# Patient Record
Sex: FEMALE | Race: WHITE | NOT HISPANIC OR LATINO
[De-identification: names, ages, dates, MRNs, and addresses within clinical notes are randomized per-mention and may not be internally consistent; named-entity substitution may affect disease eponyms.]

---

## 2017-08-21 ENCOUNTER — APPOINTMENT (OUTPATIENT)
Dept: OTOLARYNGOLOGY | Facility: CLINIC | Age: 37
End: 2017-08-21
Payer: MEDICAID

## 2017-08-21 ENCOUNTER — LABORATORY RESULT (OUTPATIENT)
Age: 37
End: 2017-08-21

## 2017-08-21 VITALS
HEART RATE: 67 BPM | DIASTOLIC BLOOD PRESSURE: 77 MMHG | TEMPERATURE: 98.4 F | OXYGEN SATURATION: 97 % | SYSTOLIC BLOOD PRESSURE: 112 MMHG

## 2017-08-21 VITALS — BODY MASS INDEX: 27.47 KG/M2 | HEIGHT: 67 IN | WEIGHT: 175 LBS

## 2017-08-21 DIAGNOSIS — Z83.3 FAMILY HISTORY OF DIABETES MELLITUS: ICD-10-CM

## 2017-08-21 DIAGNOSIS — R09.81 NASAL CONGESTION: ICD-10-CM

## 2017-08-21 DIAGNOSIS — Z84.89 FAMILY HISTORY OF OTHER SPECIFIED CONDITIONS: ICD-10-CM

## 2017-08-21 DIAGNOSIS — F17.200 NICOTINE DEPENDENCE, UNSPECIFIED, UNCOMPLICATED: ICD-10-CM

## 2017-08-21 DIAGNOSIS — Z82.2 FAMILY HISTORY OF DEAFNESS AND HEARING LOSS: ICD-10-CM

## 2017-08-21 DIAGNOSIS — Z82.49 FAMILY HISTORY OF ISCHEMIC HEART DISEASE AND OTHER DISEASES OF THE CIRCULATORY SYSTEM: ICD-10-CM

## 2017-08-21 DIAGNOSIS — J34.89 NASAL CONGESTION: ICD-10-CM

## 2017-08-21 DIAGNOSIS — R09.89 OTHER SPECIFIED SYMPTOMS AND SIGNS INVOLVING THE CIRCULATORY AND RESPIRATORY SYSTEMS: ICD-10-CM

## 2017-08-21 DIAGNOSIS — Z87.09 PERSONAL HISTORY OF OTHER DISEASES OF THE RESPIRATORY SYSTEM: ICD-10-CM

## 2017-08-21 DIAGNOSIS — R51 HEADACHE: ICD-10-CM

## 2017-08-21 DIAGNOSIS — F15.90 OTHER STIMULANT USE, UNSPECIFIED, UNCOMPLICATED: ICD-10-CM

## 2017-08-21 DIAGNOSIS — J30.0 VASOMOTOR RHINITIS: ICD-10-CM

## 2017-08-21 DIAGNOSIS — Z81.8 FAMILY HISTORY OF OTHER MENTAL AND BEHAVIORAL DISORDERS: ICD-10-CM

## 2017-08-21 DIAGNOSIS — J34.2 DEVIATED NASAL SEPTUM: ICD-10-CM

## 2017-08-21 DIAGNOSIS — Z80.9 FAMILY HISTORY OF MALIGNANT NEOPLASM, UNSPECIFIED: ICD-10-CM

## 2017-08-21 DIAGNOSIS — H93.A9 PULSATILE TINNITUS, UNSPECIFIED EAR: ICD-10-CM

## 2017-08-21 PROCEDURE — 92550 TYMPANOMETRY & REFLEX THRESH: CPT

## 2017-08-21 PROCEDURE — 99203 OFFICE O/P NEW LOW 30 MIN: CPT | Mod: 25

## 2017-08-21 PROCEDURE — 92557 COMPREHENSIVE HEARING TEST: CPT

## 2017-08-21 PROCEDURE — 31231 NASAL ENDOSCOPY DX: CPT

## 2017-08-21 RX ORDER — FLUTICASONE PROPIONATE 50 UG/1
50 SPRAY, METERED NASAL DAILY
Qty: 1 | Refills: 6 | Status: ACTIVE | COMMUNITY
Start: 2017-08-21 | End: 1900-01-01

## 2017-08-24 ENCOUNTER — APPOINTMENT (OUTPATIENT)
Dept: BARIATRICS | Facility: CLINIC | Age: 37
End: 2017-08-24
Payer: MEDICAID

## 2017-08-24 VITALS
WEIGHT: 175 LBS | TEMPERATURE: 98.1 F | SYSTOLIC BLOOD PRESSURE: 115 MMHG | OXYGEN SATURATION: 98 % | HEIGHT: 67 IN | HEART RATE: 80 BPM | DIASTOLIC BLOOD PRESSURE: 76 MMHG | BODY MASS INDEX: 27.47 KG/M2

## 2017-08-24 DIAGNOSIS — K91.2 POSTSURGICAL MALABSORPTION, NOT ELSEWHERE CLASSIFIED: ICD-10-CM

## 2017-08-24 LAB
A ALTERNATA IGE QN: 0.13 KUA/L
A FUMIGATUS IGE QN: 0.23 KUA/L
C ALBICANS IGE QN: 0.34 KUA/L
C HERBARUM IGE QN: 0.21 KUA/L
CAT DANDER IGE QN: <0.1 KUA/L
COMMON RAGWEED IGE QN: <0.1 KUA/L
D FARINAE IGE QN: 0.2 KUA/L
D PTERONYSS IGE QN: 0.16 KUA/L
DEPRECATED A ALTERNATA IGE RAST QL: NORMAL
DEPRECATED A FUMIGATUS IGE RAST QL: NORMAL
DEPRECATED C ALBICANS IGE RAST QL: NORMAL
DEPRECATED C HERBARUM IGE RAST QL: NORMAL
DEPRECATED CAT DANDER IGE RAST QL: 0
DEPRECATED COMMON RAGWEED IGE RAST QL: 0
DEPRECATED D FARINAE IGE RAST QL: NORMAL
DEPRECATED D PTERONYSS IGE RAST QL: NORMAL
DEPRECATED DOG DANDER IGE RAST QL: 0
DEPRECATED M RACEMOSUS IGE RAST QL: NORMAL
DEPRECATED ROACH IGE RAST QL: NORMAL
DEPRECATED TIMOTHY IGE RAST QL: NORMAL
DEPRECATED WHITE OAK IGE RAST QL: NORMAL
DOG DANDER IGE QN: <0.1 KUA/L
M RACEMOSUS IGE QN: 0.12 KUA/L
ROACH IGE QN: 0.18 KUA/L
TIMOTHY IGE QN: 0.21 KUA/L
WHITE OAK IGE QN: 0.19 KUA/L

## 2017-08-24 PROCEDURE — 99213 OFFICE O/P EST LOW 20 MIN: CPT

## 2017-08-24 RX ORDER — SUCRALFATE 1 G/10ML
1 SUSPENSION ORAL 3 TIMES DAILY
Qty: 850 | Refills: 1 | Status: ACTIVE | COMMUNITY
Start: 2017-08-24 | End: 1900-01-01

## 2017-09-01 PROBLEM — K91.2 POSTOPERATIVE MALABSORPTION: Status: ACTIVE | Noted: 2017-08-24

## 2017-09-15 ENCOUNTER — OTHER (OUTPATIENT)
Age: 37
End: 2017-09-15

## 2017-10-26 ENCOUNTER — APPOINTMENT (OUTPATIENT)
Dept: BARIATRICS | Facility: CLINIC | Age: 37
End: 2017-10-26

## 2017-11-16 ENCOUNTER — APPOINTMENT (OUTPATIENT)
Dept: BARIATRICS | Facility: CLINIC | Age: 37
End: 2017-11-16
Payer: MEDICAID

## 2017-11-16 VITALS
DIASTOLIC BLOOD PRESSURE: 81 MMHG | BODY MASS INDEX: 26.55 KG/M2 | TEMPERATURE: 98.1 F | SYSTOLIC BLOOD PRESSURE: 131 MMHG | HEIGHT: 67 IN | HEART RATE: 86 BPM | WEIGHT: 169.13 LBS | OXYGEN SATURATION: 99 %

## 2017-11-16 PROCEDURE — 99213 OFFICE O/P EST LOW 20 MIN: CPT

## 2018-01-16 RX ORDER — SCOPALAMINE 1 MG/3D
1.5 PATCH, EXTENDED RELEASE TRANSDERMAL ONCE
Qty: 0 | Refills: 0 | Status: COMPLETED | OUTPATIENT
Start: 2018-01-23 | End: 2018-01-23

## 2018-01-18 ENCOUNTER — APPOINTMENT (OUTPATIENT)
Dept: BARIATRICS | Facility: CLINIC | Age: 38
End: 2018-01-18
Payer: MEDICAID

## 2018-01-18 VITALS
HEART RATE: 85 BPM | OXYGEN SATURATION: 98 % | HEIGHT: 67 IN | DIASTOLIC BLOOD PRESSURE: 78 MMHG | WEIGHT: 177 LBS | TEMPERATURE: 97.6 F | SYSTOLIC BLOOD PRESSURE: 110 MMHG | BODY MASS INDEX: 27.78 KG/M2

## 2018-01-18 PROCEDURE — 99213 OFFICE O/P EST LOW 20 MIN: CPT

## 2018-01-22 VITALS
OXYGEN SATURATION: 100 % | DIASTOLIC BLOOD PRESSURE: 66 MMHG | HEART RATE: 72 BPM | HEIGHT: 66.5 IN | RESPIRATION RATE: 18 BRPM | SYSTOLIC BLOOD PRESSURE: 104 MMHG | TEMPERATURE: 98 F | WEIGHT: 175.05 LBS

## 2018-01-22 NOTE — PATIENT PROFILE ADULT. - PSH
Elective surgery  Total Body Lift  Elective surgery  Left foot surgery  H/O bariatric surgery    History of bunionectomy of both great toes    History of lithotripsy    S/P laparoscopic sleeve gastrectomy

## 2018-01-23 ENCOUNTER — APPOINTMENT (OUTPATIENT)
Dept: BARIATRICS | Facility: HOSPITAL | Age: 38
End: 2018-01-23

## 2018-01-23 ENCOUNTER — INPATIENT (INPATIENT)
Facility: HOSPITAL | Age: 38
LOS: 1 days | Discharge: HOME | DRG: 328 | End: 2018-01-25
Attending: SURGERY | Admitting: SURGERY
Payer: COMMERCIAL

## 2018-01-23 DIAGNOSIS — Z98.890 OTHER SPECIFIED POSTPROCEDURAL STATES: Chronic | ICD-10-CM

## 2018-01-23 DIAGNOSIS — K21.9 GASTRO-ESOPHAGEAL REFLUX DISEASE WITHOUT ESOPHAGITIS: ICD-10-CM

## 2018-01-23 DIAGNOSIS — Z98.84 BARIATRIC SURGERY STATUS: Chronic | ICD-10-CM

## 2018-01-23 DIAGNOSIS — Z41.9 ENCOUNTER FOR PROCEDURE FOR PURPOSES OTHER THAN REMEDYING HEALTH STATE, UNSPECIFIED: Chronic | ICD-10-CM

## 2018-01-23 LAB
GLUCOSE BLDC GLUCOMTR-MCNC: 111 MG/DL — HIGH (ref 70–99)
GLUCOSE BLDC GLUCOMTR-MCNC: 121 MG/DL — HIGH (ref 70–99)
HCT VFR BLD CALC: 32.1 % — LOW (ref 34.5–45)
HGB BLD-MCNC: 9.7 G/DL — LOW (ref 11.5–15.5)
MCHC RBC-ENTMCNC: 22.6 PG — LOW (ref 27–34)
MCHC RBC-ENTMCNC: 30.2 G/DL — LOW (ref 32–36)
MCV RBC AUTO: 74.7 FL — LOW (ref 80–100)
PLATELET # BLD AUTO: 235 K/UL — SIGNIFICANT CHANGE UP (ref 150–400)
RBC # BLD: 4.3 M/UL — SIGNIFICANT CHANGE UP (ref 3.8–5.2)
RBC # FLD: 17.9 % — HIGH (ref 10.3–16.9)
WBC # BLD: 12.2 K/UL — HIGH (ref 3.8–10.5)
WBC # FLD AUTO: 12.2 K/UL — HIGH (ref 3.8–10.5)

## 2018-01-23 PROCEDURE — 43644 LAP GASTRIC BYPASS/ROUX-EN-Y: CPT | Mod: GC

## 2018-01-23 RX ORDER — DEXTROSE 50 % IN WATER 50 %
12.5 SYRINGE (ML) INTRAVENOUS ONCE
Qty: 0 | Refills: 0 | Status: DISCONTINUED | OUTPATIENT
Start: 2018-01-23 | End: 2018-01-25

## 2018-01-23 RX ORDER — ENOXAPARIN SODIUM 100 MG/ML
40 INJECTION SUBCUTANEOUS ONCE
Qty: 0 | Refills: 0 | Status: COMPLETED | OUTPATIENT
Start: 2018-01-23 | End: 2018-01-23

## 2018-01-23 RX ORDER — ONDANSETRON 8 MG/1
4 TABLET, FILM COATED ORAL EVERY 6 HOURS
Qty: 0 | Refills: 0 | Status: DISCONTINUED | OUTPATIENT
Start: 2018-01-23 | End: 2018-01-25

## 2018-01-23 RX ORDER — HEPARIN SODIUM 5000 [USP'U]/ML
7500 INJECTION INTRAVENOUS; SUBCUTANEOUS EVERY 8 HOURS
Qty: 0 | Refills: 0 | Status: DISCONTINUED | OUTPATIENT
Start: 2018-01-23 | End: 2018-01-23

## 2018-01-23 RX ORDER — PANTOPRAZOLE SODIUM 20 MG/1
40 TABLET, DELAYED RELEASE ORAL DAILY
Qty: 0 | Refills: 0 | Status: DISCONTINUED | OUTPATIENT
Start: 2018-01-23 | End: 2018-01-25

## 2018-01-23 RX ORDER — HYDROMORPHONE HYDROCHLORIDE 2 MG/ML
1 INJECTION INTRAMUSCULAR; INTRAVENOUS; SUBCUTANEOUS EVERY 4 HOURS
Qty: 0 | Refills: 0 | Status: DISCONTINUED | OUTPATIENT
Start: 2018-01-23 | End: 2018-01-24

## 2018-01-23 RX ORDER — HYDROMORPHONE HYDROCHLORIDE 2 MG/ML
0.5 INJECTION INTRAMUSCULAR; INTRAVENOUS; SUBCUTANEOUS EVERY 4 HOURS
Qty: 0 | Refills: 0 | Status: DISCONTINUED | OUTPATIENT
Start: 2018-01-23 | End: 2018-01-24

## 2018-01-23 RX ORDER — SODIUM CHLORIDE 9 MG/ML
1000 INJECTION, SOLUTION INTRAVENOUS
Qty: 0 | Refills: 0 | Status: DISCONTINUED | OUTPATIENT
Start: 2018-01-23 | End: 2018-01-25

## 2018-01-23 RX ORDER — INSULIN LISPRO 100/ML
VIAL (ML) SUBCUTANEOUS
Qty: 0 | Refills: 0 | Status: DISCONTINUED | OUTPATIENT
Start: 2018-01-23 | End: 2018-01-25

## 2018-01-23 RX ORDER — DEXTROSE 50 % IN WATER 50 %
25 SYRINGE (ML) INTRAVENOUS ONCE
Qty: 0 | Refills: 0 | Status: DISCONTINUED | OUTPATIENT
Start: 2018-01-23 | End: 2018-01-25

## 2018-01-23 RX ORDER — HEPARIN SODIUM 5000 [USP'U]/ML
5000 INJECTION INTRAVENOUS; SUBCUTANEOUS EVERY 8 HOURS
Qty: 0 | Refills: 0 | Status: DISCONTINUED | OUTPATIENT
Start: 2018-01-23 | End: 2018-01-25

## 2018-01-23 RX ORDER — SODIUM CHLORIDE 9 MG/ML
1000 INJECTION, SOLUTION INTRAVENOUS
Qty: 0 | Refills: 0 | Status: DISCONTINUED | OUTPATIENT
Start: 2018-01-23 | End: 2018-01-24

## 2018-01-23 RX ORDER — DEXTROSE 50 % IN WATER 50 %
1 SYRINGE (ML) INTRAVENOUS ONCE
Qty: 0 | Refills: 0 | Status: DISCONTINUED | OUTPATIENT
Start: 2018-01-23 | End: 2018-01-25

## 2018-01-23 RX ORDER — GLUCAGON INJECTION, SOLUTION 0.5 MG/.1ML
1 INJECTION, SOLUTION SUBCUTANEOUS ONCE
Qty: 0 | Refills: 0 | Status: DISCONTINUED | OUTPATIENT
Start: 2018-01-23 | End: 2018-01-25

## 2018-01-23 RX ADMIN — PANTOPRAZOLE SODIUM 40 MILLIGRAM(S): 20 TABLET, DELAYED RELEASE ORAL at 17:37

## 2018-01-23 RX ADMIN — ONDANSETRON 4 MILLIGRAM(S): 8 TABLET, FILM COATED ORAL at 14:45

## 2018-01-23 RX ADMIN — HEPARIN SODIUM 5000 UNIT(S): 5000 INJECTION INTRAVENOUS; SUBCUTANEOUS at 22:00

## 2018-01-23 RX ADMIN — HYDROMORPHONE HYDROCHLORIDE 1 MILLIGRAM(S): 2 INJECTION INTRAMUSCULAR; INTRAVENOUS; SUBCUTANEOUS at 21:41

## 2018-01-23 RX ADMIN — SODIUM CHLORIDE 150 MILLILITER(S): 9 INJECTION, SOLUTION INTRAVENOUS at 16:18

## 2018-01-23 RX ADMIN — SCOPALAMINE 1.5 MILLIGRAM(S): 1 PATCH, EXTENDED RELEASE TRANSDERMAL at 08:58

## 2018-01-23 RX ADMIN — HYDROMORPHONE HYDROCHLORIDE 1 MILLIGRAM(S): 2 INJECTION INTRAMUSCULAR; INTRAVENOUS; SUBCUTANEOUS at 22:00

## 2018-01-23 RX ADMIN — ENOXAPARIN SODIUM 40 MILLIGRAM(S): 100 INJECTION SUBCUTANEOUS at 08:58

## 2018-01-23 NOTE — H&P PST ADULT - HISTORY OF PRESENT ILLNESS
36 yo female with severe GERD, prior sleeve gastrectomy (2012 Dr. Leggett), lap CCY, lithotripsy presents for e;ective laparoscopic gastric bypass procedure.     PMH: GERD  PSH: sleeve gastrectomy, lap CCY, lithotrypsy  NKDA

## 2018-01-23 NOTE — BRIEF OPERATIVE NOTE - PROCEDURE
<<-----Click on this checkbox to enter Procedure Laparoscopic revision of Quin-en-Y gastric bypass procedure  01/23/2018    Active  JUJU

## 2018-01-23 NOTE — PROGRESS NOTE ADULT - SUBJECTIVE AND OBJECTIVE BOX
POST-OPERATIVE NOTE    Procedure: Conversion of sleeve gastrectomy to gastric bypass     Diagnosis/Indication: GERD    Surgeon: Dr. Leggett    S: Pt has no complaints. Denies CP, SOB, BURT, calf tenderness. Pain controlled with medication. Denies nausea, vomiting.    O:  T(C): 36.6 (01-23-18 @ 15:30), Max: 36.6 (01-23-18 @ 15:30)  T(F): 97.9 (01-23-18 @ 15:30), Max: 97.9 (01-23-18 @ 15:30)  HR: 66 (01-23-18 @ 16:00) (66 - 86)  BP: 107/71 (01-23-18 @ 16:00) (105/68 - 115/71)  RR: 18 (01-23-18 @ 16:00) (16 - 22)  SpO2: 100% (01-23-18 @ 16:00) (95% - 100%)  Wt(kg): --      Gen: NAD, resting comfortably in bed  C/V: NSR  Pulm: Nonlabored breathing, no respiratory distress  Abd: soft, NT/ND. Incisional bandage is clean, dry and intact.  Extrem: WWP, no calf edema or tenderness, SCDs in place    A/P: 37y Female s/p above procedure  Diet: Bariatric Clears  IVF:   Pain/nausea control  SQH/SCDs/OOBA/IS  Dispo pending pain control, PO tolerance, clinical improvement

## 2018-01-23 NOTE — H&P PST ADULT - ASSESSMENT
36 yo female with prior sleeve gastrectomy, presents for elective laparoscopic gastric bypass surgery  - admit to regional   - bariatric clears  - LR@150  - pain meds  - antinausea meds  - IV Protonix  - post-op labs/ am labs  - home meds as appropriate

## 2018-01-23 NOTE — BRIEF OPERATIVE NOTE - OPERATION/FINDINGS
Patient underwent laparoscopic conversion of vertical sleeve gastrectomy to RNY gastric bypass without any complications. The biliopancreatic limb is approximately 40cm in length and a side-to-side functional end-to-end stapled (Blue 60mm) jejunal-jejunal anastomosis was created with closure of small bowel mesentery. The Quin limb is approximately 120cm in length and was retrocolic and anterior gastric hand-sewn gastro-jejunal anastomosis with no tension and excellent blood supply and closure of it's mesenteric defect. The methylene blue test performed and was negative.

## 2018-01-23 NOTE — H&P ADULT - PMH
Gastroesophageal reflux disease, esophagitis presence not specified    HTN (hypertension)    Obesity, unspecified classification, unspecified obesity type, unspecified whether serious comorbidity present    Prediabetes

## 2018-01-23 NOTE — H&P ADULT - HISTORY OF PRESENT ILLNESS
Patient is an 36 yo F with PMH significant for obesity s/p vertical sleeve gastrectomy (2012) who who is suffering from severe reflux despite anti-reflux medications. Patient is scheduled to undergo conversion of gastric sleeve to gastric bypass today.

## 2018-01-24 ENCOUNTER — TRANSCRIPTION ENCOUNTER (OUTPATIENT)
Age: 38
End: 2018-01-24

## 2018-01-24 ENCOUNTER — APPOINTMENT (OUTPATIENT)
Dept: RADIOLOGY | Facility: HOSPITAL | Age: 38
End: 2018-01-24

## 2018-01-24 LAB
ANION GAP SERPL CALC-SCNC: 10 MMOL/L — SIGNIFICANT CHANGE UP (ref 5–17)
BUN SERPL-MCNC: 6 MG/DL — LOW (ref 7–23)
CALCIUM SERPL-MCNC: 8.2 MG/DL — LOW (ref 8.4–10.5)
CHLORIDE SERPL-SCNC: 102 MMOL/L — SIGNIFICANT CHANGE UP (ref 96–108)
CO2 SERPL-SCNC: 27 MMOL/L — SIGNIFICANT CHANGE UP (ref 22–31)
CREAT SERPL-MCNC: 0.75 MG/DL — SIGNIFICANT CHANGE UP (ref 0.5–1.3)
GLUCOSE BLDC GLUCOMTR-MCNC: 78 MG/DL — SIGNIFICANT CHANGE UP (ref 70–99)
GLUCOSE BLDC GLUCOMTR-MCNC: 90 MG/DL — SIGNIFICANT CHANGE UP (ref 70–99)
GLUCOSE BLDC GLUCOMTR-MCNC: 91 MG/DL — SIGNIFICANT CHANGE UP (ref 70–99)
GLUCOSE BLDC GLUCOMTR-MCNC: 97 MG/DL — SIGNIFICANT CHANGE UP (ref 70–99)
GLUCOSE SERPL-MCNC: 83 MG/DL — SIGNIFICANT CHANGE UP (ref 70–99)
HBA1C BLD-MCNC: 4.8 % — SIGNIFICANT CHANGE UP (ref 4–5.6)
HCT VFR BLD CALC: 27.5 % — LOW (ref 34.5–45)
HGB BLD-MCNC: 8.5 G/DL — LOW (ref 11.5–15.5)
MAGNESIUM SERPL-MCNC: 1.8 MG/DL — SIGNIFICANT CHANGE UP (ref 1.6–2.6)
MCHC RBC-ENTMCNC: 23 PG — LOW (ref 27–34)
MCHC RBC-ENTMCNC: 30.9 G/DL — LOW (ref 32–36)
MCV RBC AUTO: 74.3 FL — LOW (ref 80–100)
PHOSPHATE SERPL-MCNC: 2.9 MG/DL — SIGNIFICANT CHANGE UP (ref 2.5–4.5)
PLATELET # BLD AUTO: 189 K/UL — SIGNIFICANT CHANGE UP (ref 150–400)
POTASSIUM SERPL-MCNC: 3.7 MMOL/L — SIGNIFICANT CHANGE UP (ref 3.5–5.3)
POTASSIUM SERPL-SCNC: 3.7 MMOL/L — SIGNIFICANT CHANGE UP (ref 3.5–5.3)
RBC # BLD: 3.7 M/UL — LOW (ref 3.8–5.2)
RBC # FLD: 17.8 % — HIGH (ref 10.3–16.9)
SODIUM SERPL-SCNC: 139 MMOL/L — SIGNIFICANT CHANGE UP (ref 135–145)
WBC # BLD: 7.5 K/UL — SIGNIFICANT CHANGE UP (ref 3.8–10.5)
WBC # FLD AUTO: 7.5 K/UL — SIGNIFICANT CHANGE UP (ref 3.8–10.5)

## 2018-01-24 PROCEDURE — 74241: CPT | Mod: 26

## 2018-01-24 RX ORDER — MAGNESIUM SULFATE 500 MG/ML
1 VIAL (ML) INJECTION ONCE
Qty: 0 | Refills: 0 | Status: COMPLETED | OUTPATIENT
Start: 2018-01-24 | End: 2018-01-24

## 2018-01-24 RX ORDER — SODIUM CHLORIDE 9 MG/ML
1000 INJECTION, SOLUTION INTRAVENOUS
Qty: 0 | Refills: 0 | Status: DISCONTINUED | OUTPATIENT
Start: 2018-01-24 | End: 2018-01-25

## 2018-01-24 RX ORDER — OXYCODONE AND ACETAMINOPHEN 5; 325 MG/1; MG/1
2 TABLET ORAL EVERY 4 HOURS
Qty: 0 | Refills: 0 | Status: DISCONTINUED | OUTPATIENT
Start: 2018-01-24 | End: 2018-01-25

## 2018-01-24 RX ORDER — POTASSIUM CHLORIDE 20 MEQ
10 PACKET (EA) ORAL
Qty: 0 | Refills: 0 | Status: COMPLETED | OUTPATIENT
Start: 2018-01-24 | End: 2018-01-24

## 2018-01-24 RX ORDER — OXYCODONE AND ACETAMINOPHEN 5; 325 MG/1; MG/1
1 TABLET ORAL EVERY 4 HOURS
Qty: 0 | Refills: 0 | Status: DISCONTINUED | OUTPATIENT
Start: 2018-01-24 | End: 2018-01-25

## 2018-01-24 RX ADMIN — HEPARIN SODIUM 5000 UNIT(S): 5000 INJECTION INTRAVENOUS; SUBCUTANEOUS at 06:00

## 2018-01-24 RX ADMIN — OXYCODONE AND ACETAMINOPHEN 2 TABLET(S): 5; 325 TABLET ORAL at 22:20

## 2018-01-24 RX ADMIN — OXYCODONE AND ACETAMINOPHEN 2 TABLET(S): 5; 325 TABLET ORAL at 21:47

## 2018-01-24 RX ADMIN — HEPARIN SODIUM 5000 UNIT(S): 5000 INJECTION INTRAVENOUS; SUBCUTANEOUS at 21:47

## 2018-01-24 RX ADMIN — HYDROMORPHONE HYDROCHLORIDE 0.5 MILLIGRAM(S): 2 INJECTION INTRAMUSCULAR; INTRAVENOUS; SUBCUTANEOUS at 09:04

## 2018-01-24 RX ADMIN — HYDROMORPHONE HYDROCHLORIDE 0.5 MILLIGRAM(S): 2 INJECTION INTRAMUSCULAR; INTRAVENOUS; SUBCUTANEOUS at 08:29

## 2018-01-24 RX ADMIN — HYDROMORPHONE HYDROCHLORIDE 1 MILLIGRAM(S): 2 INJECTION INTRAMUSCULAR; INTRAVENOUS; SUBCUTANEOUS at 10:31

## 2018-01-24 RX ADMIN — HYDROMORPHONE HYDROCHLORIDE 1 MILLIGRAM(S): 2 INJECTION INTRAMUSCULAR; INTRAVENOUS; SUBCUTANEOUS at 03:25

## 2018-01-24 RX ADMIN — HYDROMORPHONE HYDROCHLORIDE 1 MILLIGRAM(S): 2 INJECTION INTRAMUSCULAR; INTRAVENOUS; SUBCUTANEOUS at 11:30

## 2018-01-24 RX ADMIN — OXYCODONE AND ACETAMINOPHEN 2 TABLET(S): 5; 325 TABLET ORAL at 16:03

## 2018-01-24 RX ADMIN — PANTOPRAZOLE SODIUM 40 MILLIGRAM(S): 20 TABLET, DELAYED RELEASE ORAL at 12:33

## 2018-01-24 RX ADMIN — OXYCODONE AND ACETAMINOPHEN 2 TABLET(S): 5; 325 TABLET ORAL at 16:22

## 2018-01-24 RX ADMIN — Medication 100 MILLIEQUIVALENT(S): at 10:24

## 2018-01-24 RX ADMIN — HEPARIN SODIUM 5000 UNIT(S): 5000 INJECTION INTRAVENOUS; SUBCUTANEOUS at 14:17

## 2018-01-24 RX ADMIN — Medication 100 MILLIEQUIVALENT(S): at 11:30

## 2018-01-24 RX ADMIN — HYDROMORPHONE HYDROCHLORIDE 1 MILLIGRAM(S): 2 INJECTION INTRAMUSCULAR; INTRAVENOUS; SUBCUTANEOUS at 03:40

## 2018-01-24 RX ADMIN — Medication 100 MILLIEQUIVALENT(S): at 13:13

## 2018-01-24 RX ADMIN — Medication 100 GRAM(S): at 16:07

## 2018-01-24 NOTE — PROGRESS NOTE ADULT - SUBJECTIVE AND OBJECTIVE BOX
O/N: postop H/H 9.7/32.5, passed TOV  1/23: s/p conversion of sleeve gastrectomy  to gastric bypass  due to gerd , POC WNL , VSS     1/23: lap DS OVERNIGHT EVENTS:postop H/H 9.7/32.5, passed TOV  1/23: s/p conversion of sleeve gastrectomy  to gastric bypass  due to gerd , POC WNL , VSS     STATUS POST:  Laparoscopic DS    POST OPERATIVE DAY #:  1    SUBJECTIVE:  Flatus: [] YES [X] NO             Bowel Movement: [ ] YES [X ] NO  Pain (0-10):            Pain Control Adequate: [ X] YES [ ] NO  Nausea: [ ] YES [ X] NO            Vomiting: [ ] YES [X ] NO  Diarrhea: [ ] YES [X ] NO         Constipation: [ ] YES [ X] NO     Chest Pain: [ ] YES [X ] NO    SOB:  [ ] YES [X ] NO    heparin  Injectable 5000 Unit(s) SubCutaneous every 8 hours      Vital Signs Last 24 Hrs  T(C): 36.7 (24 Jan 2018 06:01), Max: 37.3 (23 Jan 2018 16:58)  T(F): 98 (24 Jan 2018 06:01), Max: 99.2 (23 Jan 2018 16:58)  HR: 66 (24 Jan 2018 06:01) (60 - 86)  BP: 102/66 (24 Jan 2018 06:01) (100/68 - 115/71)  BP(mean): 82 (23 Jan 2018 16:00) (79 - 87)  RR: 16 (24 Jan 2018 06:01) (16 - 22)  SpO2: 98% (24 Jan 2018 06:01) (95% - 100%)  I&O's Detail    23 Jan 2018 07:01  -  24 Jan 2018 07:00  --------------------------------------------------------  IN:    lactated ringers.: 1650 mL    Oral Fluid: 280 mL    Other: 1875 mL  Total IN: 3805 mL    OUT:    Voided: 1050 mL  Total OUT: 1050 mL    Total NET: 2755 mL          General: NAD, resting comfortably in bed  C/V: NSR  Pulm: Nonlabored breathing, no respiratory distress  Abd: soft, non distended , TTP around incision clean dry and intact  Extrem: WWP, no edema, SCDs in place        LABS:                        9.7    12.2  )-----------( 235      ( 23 Jan 2018 19:25 )             32.1 OVERNIGHT EVENTS:postop H/H 9.7/32.5, passed TOV  1/23: s/p conversion of sleeve gastrectomy  to gastric bypass  due to gerd , POC WNL , VSS     STATUS POST:  Gastric bypass     POST OPERATIVE DAY #:  1    SUBJECTIVE:  Flatus: [] YES [X] NO             Bowel Movement: [ ] YES [X ] NO  Pain (0-10):            Pain Control Adequate: [ X] YES [ ] NO  Nausea: [ ] YES [ X] NO            Vomiting: [ ] YES [X ] NO  Diarrhea: [ ] YES [X ] NO         Constipation: [ ] YES [ X] NO     Chest Pain: [ ] YES [X ] NO    SOB:  [ ] YES [X ] NO    heparin  Injectable 5000 Unit(s) SubCutaneous every 8 hours      Vital Signs Last 24 Hrs  T(C): 36.7 (24 Jan 2018 06:01), Max: 37.3 (23 Jan 2018 16:58)  T(F): 98 (24 Jan 2018 06:01), Max: 99.2 (23 Jan 2018 16:58)  HR: 66 (24 Jan 2018 06:01) (60 - 86)  BP: 102/66 (24 Jan 2018 06:01) (100/68 - 115/71)  BP(mean): 82 (23 Jan 2018 16:00) (79 - 87)  RR: 16 (24 Jan 2018 06:01) (16 - 22)  SpO2: 98% (24 Jan 2018 06:01) (95% - 100%)  I&O's Detail    23 Jan 2018 07:01  -  24 Jan 2018 07:00  --------------------------------------------------------  IN:    lactated ringers.: 1650 mL    Oral Fluid: 280 mL    Other: 1875 mL  Total IN: 3805 mL    OUT:    Voided: 1050 mL  Total OUT: 1050 mL    Total NET: 2755 mL          General: NAD, resting comfortably in bed  C/V: NSR  Pulm: Nonlabored breathing, no respiratory distress  Abd: soft, non distended , TTP around incision clean dry and intact  Extrem: WWP, no edema, SCDs in place        LABS:                        9.7    12.2  )-----------( 235      ( 23 Jan 2018 19:25 )             32.1

## 2018-01-24 NOTE — DISCHARGE NOTE ADULT - CARE PROVIDER_API CALL
Sam Leggett (MD), Surgery  [address]  [address]  [place], [place] [address]  Phone: [phone]  Fax: [phone]

## 2018-01-24 NOTE — DIETITIAN INITIAL EVALUATION ADULT. - ENERGY NEEDS
Height: 5'6.5" Weight: 175lbs, .5lbs+/-10%, %%, BMI 27.8  IBW used for calculations as pt >120% of IBW   Above energy needs calculated for wt maintenance (20-25kcal/kg).   Weeks 1-2 estimated needs: 601-721kcal/day (10-12kcal/kg), 90-126g pro/day (1.5-2.1g/kg), >/=64oz clear fluids.

## 2018-01-24 NOTE — DISCHARGE NOTE ADULT - PLAN OF CARE
Recovery Follow up with  in 1 week. Call the office at  to schedule your appointment.  You may shower; soap and water over incision sites. Do not scrub. Pat dry when done. No tub bathing or swimming until cleared. Keep incision sites out of the sun as scars will darken. No heavy lifting (>10lbs) or strenuous exercise. Diet: Bariatric Full Fluids. 60 grams protein daily.  64 fluid ounces water daily. Drink small sips throughout the day. Continue diet as outlined by paperwork received as a pre-operative patient. You should be urinating at least 3-4x per day. Call the office if you experience increasing abdominal pain, nausea, vomiting, or temperature >100.4F.  NO ASPIRIN OR NSAIDs until approved by Dr. Leggett. Avoid alcoholic beverages until cleared by Dr. Leggett.

## 2018-01-24 NOTE — DISCHARGE NOTE ADULT - CARE PLAN
Principal Discharge DX:	Gastroesophageal reflux disease, esophagitis presence not specified  Goal:	Recovery  Assessment and plan of treatment:	Follow up with  in 1 week. Call the office at  to schedule your appointment.  You may shower; soap and water over incision sites. Do not scrub. Pat dry when done. No tub bathing or swimming until cleared. Keep incision sites out of the sun as scars will darken. No heavy lifting (>10lbs) or strenuous exercise. Diet: Bariatric Full Fluids. 60 grams protein daily.  64 fluid ounces water daily. Drink small sips throughout the day. Continue diet as outlined by paperwork received as a pre-operative patient. You should be urinating at least 3-4x per day. Call the office if you experience increasing abdominal pain, nausea, vomiting, or temperature >100.4F.  NO ASPIRIN OR NSAIDs until approved by Dr. Leggett. Avoid alcoholic beverages until cleared by Dr. Leggtet.

## 2018-01-24 NOTE — PROGRESS NOTE ADULT - ASSESSMENT
38 yo female with prior sleeve gastrectomy, presents for elective laparoscopic gastric bypass surgery    - bariatric clears  - LR@150  - pain meds  - antinausea meds  - IV Protonix  - ISS  -  am labs  - home meds as appropriate  - DVT ppx 36 yo female with prior sleeve gastrectomy, presents for elective laparoscopic gastric bypass surgery    - bariatric clears  - LR@150  - pain meds  - antinausea meds  - IV Protonix  - ISS  -  am labs  - home meds as appropriate  - DVT ppx

## 2018-01-24 NOTE — DISCHARGE NOTE ADULT - PATIENT PORTAL LINK FT
“You can access the [facility] Patient Portal, offered by [facility], by registering with the following website: [email or web address]”

## 2018-01-24 NOTE — DISCHARGE NOTE ADULT - MEDICATION SUMMARY - MEDICATIONS TO STOP TAKING
I will STOP taking the medications listed below when I get home from the hospital:    Dexilant 60 mg oral delayed release capsule  -- 1 cap(s) by mouth once a day

## 2018-01-24 NOTE — DISCHARGE NOTE ADULT - HOSPITAL COURSE
38 yo female with severe GERD, HTN, pre-diabetes, prior sleeve gastrectomy (2012 Dr. Leggett), lap CCY, lithotripsy presents for elective laparoscopic gastric bypass procedure. 1/23: Laparoscopic duodenal switch. Pt tolerated the procedure well. Post-operatively, the pt's UGI was wnl. At time of discharge, pt was tolerating a bariatric clear liquid diet, and pt's pain was controlled. Plan is to follow up with Dr. Leggett  in the office. 38 yo female with severe GERD, HTN, pre-diabetes, prior sleeve gastrectomy (2012 Dr. Leggett), lap CCY, lithotripsy presents for elective laparoscopic gastric bypass procedure. 1/23: Gastric Bypass. Pt tolerated the procedure well. Post-operatively, the pt's UGI was wnl. At time of discharge, pt was tolerating a bariatric clear liquid diet, and pt's pain was controlled. Plan is to follow up with Dr. Leggett  in the office.

## 2018-01-24 NOTE — DIETITIAN INITIAL EVALUATION ADULT. - OTHER INFO
36yo F s/p conversion of sleeve gastrectomy to gastric bypass d/t GERD. pt currently on BARICLLIQ diet and tolerating PO. Has been sipping water throughout day and monitoring intake. Pt is prepared with protein supplements, plans to purchase required vitamins after discharge. has not been taking B12 since last Sx. RD provided written and oral edu on diet advancement process and specific nutrient needs s/p RYGB. NKFA or dietary restrictions. Skin: surgical incision; GI WDL per flowsheet.

## 2018-01-25 VITALS
HEART RATE: 63 BPM | OXYGEN SATURATION: 97 % | SYSTOLIC BLOOD PRESSURE: 100 MMHG | DIASTOLIC BLOOD PRESSURE: 68 MMHG | TEMPERATURE: 97 F | RESPIRATION RATE: 17 BRPM

## 2018-01-25 LAB
ANION GAP SERPL CALC-SCNC: 15 MMOL/L — SIGNIFICANT CHANGE UP (ref 5–17)
BUN SERPL-MCNC: 5 MG/DL — LOW (ref 7–23)
CALCIUM SERPL-MCNC: 8.8 MG/DL — SIGNIFICANT CHANGE UP (ref 8.4–10.5)
CHLORIDE SERPL-SCNC: 100 MMOL/L — SIGNIFICANT CHANGE UP (ref 96–108)
CO2 SERPL-SCNC: 23 MMOL/L — SIGNIFICANT CHANGE UP (ref 22–31)
CREAT SERPL-MCNC: 0.66 MG/DL — SIGNIFICANT CHANGE UP (ref 0.5–1.3)
GLUCOSE BLDC GLUCOMTR-MCNC: 79 MG/DL — SIGNIFICANT CHANGE UP (ref 70–99)
GLUCOSE BLDC GLUCOMTR-MCNC: 79 MG/DL — SIGNIFICANT CHANGE UP (ref 70–99)
GLUCOSE SERPL-MCNC: 81 MG/DL — SIGNIFICANT CHANGE UP (ref 70–99)
HCT VFR BLD CALC: 28.3 % — LOW (ref 34.5–45)
HGB BLD-MCNC: 8.8 G/DL — LOW (ref 11.5–15.5)
MAGNESIUM SERPL-MCNC: 2 MG/DL — SIGNIFICANT CHANGE UP (ref 1.6–2.6)
MCHC RBC-ENTMCNC: 23.1 PG — LOW (ref 27–34)
MCHC RBC-ENTMCNC: 31.1 G/DL — LOW (ref 32–36)
MCV RBC AUTO: 74.3 FL — LOW (ref 80–100)
PHOSPHATE SERPL-MCNC: 2.7 MG/DL — SIGNIFICANT CHANGE UP (ref 2.5–4.5)
PLATELET # BLD AUTO: 197 K/UL — SIGNIFICANT CHANGE UP (ref 150–400)
POTASSIUM SERPL-MCNC: 3.9 MMOL/L — SIGNIFICANT CHANGE UP (ref 3.5–5.3)
POTASSIUM SERPL-SCNC: 3.9 MMOL/L — SIGNIFICANT CHANGE UP (ref 3.5–5.3)
RBC # BLD: 3.81 M/UL — SIGNIFICANT CHANGE UP (ref 3.8–5.2)
RBC # FLD: 17.5 % — HIGH (ref 10.3–16.9)
SODIUM SERPL-SCNC: 138 MMOL/L — SIGNIFICANT CHANGE UP (ref 135–145)
WBC # BLD: 7 K/UL — SIGNIFICANT CHANGE UP (ref 3.8–10.5)
WBC # FLD AUTO: 7 K/UL — SIGNIFICANT CHANGE UP (ref 3.8–10.5)

## 2018-01-25 PROCEDURE — 86900 BLOOD TYPING SEROLOGIC ABO: CPT

## 2018-01-25 PROCEDURE — 83036 HEMOGLOBIN GLYCOSYLATED A1C: CPT

## 2018-01-25 PROCEDURE — 74241: CPT

## 2018-01-25 PROCEDURE — 83735 ASSAY OF MAGNESIUM: CPT

## 2018-01-25 PROCEDURE — 86901 BLOOD TYPING SEROLOGIC RH(D): CPT

## 2018-01-25 PROCEDURE — 36415 COLL VENOUS BLD VENIPUNCTURE: CPT

## 2018-01-25 PROCEDURE — 82962 GLUCOSE BLOOD TEST: CPT

## 2018-01-25 PROCEDURE — 85027 COMPLETE CBC AUTOMATED: CPT

## 2018-01-25 PROCEDURE — 86850 RBC ANTIBODY SCREEN: CPT

## 2018-01-25 PROCEDURE — 80048 BASIC METABOLIC PNL TOTAL CA: CPT

## 2018-01-25 PROCEDURE — 84100 ASSAY OF PHOSPHORUS: CPT

## 2018-01-25 RX ORDER — RANITIDINE HYDROCHLORIDE 150 MG/1
1 TABLET, FILM COATED ORAL
Qty: 0 | Refills: 0 | COMMUNITY

## 2018-01-25 RX ORDER — FERROUS SULFATE 325(65) MG
1 TABLET ORAL
Qty: 42 | Refills: 0 | OUTPATIENT
Start: 2018-01-25 | End: 2018-02-07

## 2018-01-25 RX ORDER — DEXLANSOPRAZOLE 30 MG/1
1 CAPSULE, DELAYED RELEASE ORAL
Qty: 0 | Refills: 0 | COMMUNITY

## 2018-01-25 RX ORDER — DOCUSATE SODIUM 100 MG
1 CAPSULE ORAL
Qty: 28 | Refills: 0
Start: 2018-01-25 | End: 2018-02-07

## 2018-01-25 RX ORDER — OMEPRAZOLE 10 MG/1
1 CAPSULE, DELAYED RELEASE ORAL
Qty: 30 | Refills: 0
Start: 2018-01-25 | End: 2018-02-23

## 2018-01-25 RX ADMIN — HEPARIN SODIUM 5000 UNIT(S): 5000 INJECTION INTRAVENOUS; SUBCUTANEOUS at 05:32

## 2018-01-25 RX ADMIN — OXYCODONE AND ACETAMINOPHEN 2 TABLET(S): 5; 325 TABLET ORAL at 04:01

## 2018-01-25 RX ADMIN — OXYCODONE AND ACETAMINOPHEN 2 TABLET(S): 5; 325 TABLET ORAL at 04:31

## 2018-01-25 RX ADMIN — OXYCODONE AND ACETAMINOPHEN 2 TABLET(S): 5; 325 TABLET ORAL at 11:46

## 2018-01-25 RX ADMIN — PANTOPRAZOLE SODIUM 40 MILLIGRAM(S): 20 TABLET, DELAYED RELEASE ORAL at 11:46

## 2018-01-25 RX ADMIN — OXYCODONE AND ACETAMINOPHEN 2 TABLET(S): 5; 325 TABLET ORAL at 12:30

## 2018-01-25 NOTE — PROGRESS NOTE ADULT - SUBJECTIVE AND OBJECTIVE BOX
STATUS POST:  POD 2 Gastric bypass     SUBJECTIVE: Patient seen and examined bedside by chief resident and surgical team. Patient denies n/v/cp/sob and will be discharged.    heparin  Injectable 5000 Unit(s) SubCutaneous every 8 hours      Vital Signs Last 24 Hrs  T(C): 36.4 (25 Jan 2018 05:44), Max: 37.1 (24 Jan 2018 08:19)  T(F): 97.5 (25 Jan 2018 05:44), Max: 98.7 (24 Jan 2018 08:19)  HR: 70 (25 Jan 2018 05:44) (67 - 73)  BP: 94/61 (25 Jan 2018 05:44) (94/61 - 111/72)  BP(mean): --  RR: 16 (25 Jan 2018 05:44) (16 - 17)  SpO2: 96% (25 Jan 2018 05:44) (95% - 99%)  I&O's Detail    23 Jan 2018 07:01  -  24 Jan 2018 07:00  --------------------------------------------------------  IN:    lactated ringers.: 1650 mL    Oral Fluid: 280 mL    Other: 1875 mL  Total IN: 3805 mL    OUT:    Voided: 1050 mL  Total OUT: 1050 mL    Total NET: 2755 mL      24 Jan 2018 07:01  -  25 Jan 2018 06:40  --------------------------------------------------------  IN:    lactated ringers.: 1200 mL    lactated ringers.: 1000 mL    Oral Fluid: 240 mL    Solution: 300 mL  Total IN: 2740 mL    OUT:    Voided: 3230 mL  Total OUT: 3230 mL    Total NET: -490 mL      General: NAD, resting comfortably in bed  C/V: NSR  Pulm: Nonlabored breathing, no respiratory distress  Abd: soft, NT/ND. Incision site is clean, dry and intact.  Extrem: WWP, no edema, SCDs in place    LABS:                        8.8    7.0   )-----------( 197      ( 25 Jan 2018 06:16 )             28.3     01-24    139  |  102  |  6<L>  ----------------------------<  83  3.7   |  27  |  0.75    Ca    8.2<L>      24 Jan 2018 07:47  Phos  2.9     01-24  Mg     1.8     01-24    RADIOLOGY & ADDITIONAL STUDIES:

## 2018-01-25 NOTE — PROGRESS NOTE ADULT - ASSESSMENT
36 yo female with prior sleeve gastrectomy, presents for elective laparoscopic gastric bypass surgery 36 yo female with prior sleeve gastrectomy, presents for elective laparoscopic gastric bypass surgery    1. POD2 s/p conversion of sleeve gastrectomy to gastric bypass  - bariatric clears  - LR@150  - pain meds  - antinausea meds  -  am labs  - home meds as appropriate  - DVT ppx- OOB, IS, SCDs    2. GERD  Hold home Dexilant, on IV protonix    3. Pre-diabetes  monitor glucose  sliding scale insulin as indicated

## 2018-01-26 ENCOUNTER — OTHER (OUTPATIENT)
Age: 38
End: 2018-01-26

## 2018-01-29 ENCOUNTER — OTHER (OUTPATIENT)
Age: 38
End: 2018-01-29

## 2018-01-29 ENCOUNTER — INPATIENT (INPATIENT)
Facility: HOSPITAL | Age: 38
LOS: 1 days | Discharge: HOME | DRG: 948 | End: 2018-01-31
Attending: SURGERY | Admitting: SURGERY
Payer: COMMERCIAL

## 2018-01-29 ENCOUNTER — APPOINTMENT (OUTPATIENT)
Dept: BARIATRICS | Facility: CLINIC | Age: 38
End: 2018-01-29
Payer: MEDICAID

## 2018-01-29 VITALS
RESPIRATION RATE: 18 BRPM | HEIGHT: 66 IN | TEMPERATURE: 98 F | WEIGHT: 167.99 LBS | DIASTOLIC BLOOD PRESSURE: 80 MMHG | HEART RATE: 108 BPM | OXYGEN SATURATION: 100 % | SYSTOLIC BLOOD PRESSURE: 122 MMHG

## 2018-01-29 VITALS
WEIGHT: 168.13 LBS | SYSTOLIC BLOOD PRESSURE: 114 MMHG | DIASTOLIC BLOOD PRESSURE: 83 MMHG | HEART RATE: 111 BPM | BODY MASS INDEX: 26.39 KG/M2 | OXYGEN SATURATION: 98 % | HEIGHT: 67 IN | TEMPERATURE: 97.7 F

## 2018-01-29 DIAGNOSIS — Z41.9 ENCOUNTER FOR PROCEDURE FOR PURPOSES OTHER THAN REMEDYING HEALTH STATE, UNSPECIFIED: Chronic | ICD-10-CM

## 2018-01-29 DIAGNOSIS — Z98.890 OTHER SPECIFIED POSTPROCEDURAL STATES: Chronic | ICD-10-CM

## 2018-01-29 DIAGNOSIS — Z98.84 BARIATRIC SURGERY STATUS: Chronic | ICD-10-CM

## 2018-01-29 DIAGNOSIS — R73.03 PREDIABETES: ICD-10-CM

## 2018-01-29 DIAGNOSIS — Z09 ENCOUNTER FOR FOLLOW-UP EXAMINATION AFTER COMPLETED TREATMENT FOR CONDITIONS OTHER THAN MALIGNANT NEOPLASM: ICD-10-CM

## 2018-01-29 DIAGNOSIS — I10 ESSENTIAL (PRIMARY) HYPERTENSION: ICD-10-CM

## 2018-01-29 DIAGNOSIS — J30.89 OTHER ALLERGIC RHINITIS: ICD-10-CM

## 2018-01-29 DIAGNOSIS — K21.0 GASTRO-ESOPHAGEAL REFLUX DISEASE WITH ESOPHAGITIS: ICD-10-CM

## 2018-01-29 DIAGNOSIS — R10.30 LOWER ABDOMINAL PAIN, UNSPECIFIED: ICD-10-CM

## 2018-01-29 DIAGNOSIS — Z87.891 PERSONAL HISTORY OF NICOTINE DEPENDENCE: ICD-10-CM

## 2018-01-29 DIAGNOSIS — E66.9 OBESITY, UNSPECIFIED: ICD-10-CM

## 2018-01-29 LAB — LACTATE SERPL-SCNC: 1 MMOL/L — SIGNIFICANT CHANGE UP (ref 0.5–2)

## 2018-01-29 PROCEDURE — 99024 POSTOP FOLLOW-UP VISIT: CPT

## 2018-01-29 PROCEDURE — 99285 EMERGENCY DEPT VISIT HI MDM: CPT

## 2018-01-29 PROCEDURE — 74177 CT ABD & PELVIS W/CONTRAST: CPT | Mod: 26

## 2018-01-29 RX ORDER — SUCRALFATE 1 G
1 TABLET ORAL DAILY
Qty: 0 | Refills: 0 | Status: DISCONTINUED | OUTPATIENT
Start: 2018-01-29 | End: 2018-01-31

## 2018-01-29 RX ORDER — SODIUM CHLORIDE 9 MG/ML
1000 INJECTION INTRAMUSCULAR; INTRAVENOUS; SUBCUTANEOUS ONCE
Qty: 0 | Refills: 0 | Status: COMPLETED | OUTPATIENT
Start: 2018-01-29 | End: 2018-01-29

## 2018-01-29 RX ORDER — ONDANSETRON 8 MG/1
4 TABLET, FILM COATED ORAL EVERY 6 HOURS
Qty: 0 | Refills: 0 | Status: DISCONTINUED | OUTPATIENT
Start: 2018-01-29 | End: 2018-01-31

## 2018-01-29 RX ORDER — MORPHINE SULFATE 50 MG/1
4 CAPSULE, EXTENDED RELEASE ORAL ONCE
Qty: 0 | Refills: 0 | Status: DISCONTINUED | OUTPATIENT
Start: 2018-01-29 | End: 2018-01-29

## 2018-01-29 RX ORDER — SODIUM CHLORIDE 9 MG/ML
1000 INJECTION INTRAMUSCULAR; INTRAVENOUS; SUBCUTANEOUS
Qty: 0 | Refills: 0 | Status: DISCONTINUED | OUTPATIENT
Start: 2018-01-29 | End: 2018-01-29

## 2018-01-29 RX ORDER — PANTOPRAZOLE SODIUM 20 MG/1
40 TABLET, DELAYED RELEASE ORAL
Qty: 0 | Refills: 0 | Status: DISCONTINUED | OUTPATIENT
Start: 2018-01-30 | End: 2018-01-31

## 2018-01-29 RX ORDER — IOHEXOL 300 MG/ML
50 INJECTION, SOLUTION INTRAVENOUS ONCE
Qty: 0 | Refills: 0 | Status: COMPLETED | OUTPATIENT
Start: 2018-01-29 | End: 2018-01-29

## 2018-01-29 RX ORDER — OXYCODONE AND ACETAMINOPHEN 5; 325 MG/1; MG/1
2 TABLET ORAL EVERY 6 HOURS
Qty: 0 | Refills: 0 | Status: DISCONTINUED | OUTPATIENT
Start: 2018-01-29 | End: 2018-01-31

## 2018-01-29 RX ORDER — OXYCODONE AND ACETAMINOPHEN 5; 325 MG/1; MG/1
1 TABLET ORAL EVERY 4 HOURS
Qty: 0 | Refills: 0 | Status: DISCONTINUED | OUTPATIENT
Start: 2018-01-29 | End: 2018-01-31

## 2018-01-29 RX ADMIN — IOHEXOL 50 MILLILITER(S): 300 INJECTION, SOLUTION INTRAVENOUS at 15:55

## 2018-01-29 RX ADMIN — MORPHINE SULFATE 4 MILLIGRAM(S): 50 CAPSULE, EXTENDED RELEASE ORAL at 17:11

## 2018-01-29 RX ADMIN — SODIUM CHLORIDE 1000 MILLILITER(S): 9 INJECTION INTRAMUSCULAR; INTRAVENOUS; SUBCUTANEOUS at 15:44

## 2018-01-29 RX ADMIN — SODIUM CHLORIDE 200 MILLILITER(S): 9 INJECTION INTRAMUSCULAR; INTRAVENOUS; SUBCUTANEOUS at 17:09

## 2018-01-29 RX ADMIN — MORPHINE SULFATE 4 MILLIGRAM(S): 50 CAPSULE, EXTENDED RELEASE ORAL at 21:37

## 2018-01-29 RX ADMIN — MORPHINE SULFATE 4 MILLIGRAM(S): 50 CAPSULE, EXTENDED RELEASE ORAL at 22:00

## 2018-01-29 RX ADMIN — MORPHINE SULFATE 4 MILLIGRAM(S): 50 CAPSULE, EXTENDED RELEASE ORAL at 15:46

## 2018-01-29 NOTE — ED PROVIDER NOTE - MEDICAL DECISION MAKING DETAILS
pt is post op day 6 w/abd pain - had bariatric surg, is well appearing, hemodynamically stable, afebrile in ed, however + leukocytosis - cultures and lactate done, surg consulted and wanting ct to eval for intra abd process (? abscess), dispo as per surg

## 2018-01-29 NOTE — ED PROVIDER NOTE - GASTROINTESTINAL, MLM
Abdomen soft, lap incisions clean and dry w/surrounding / local ecchymosis, not distended, diffuse tend to palp w/o guarding or rebound, no CVA tend b/l

## 2018-01-29 NOTE — H&P ADULT - HISTORY OF PRESENT ILLNESS
38 yo F with h/o GERD, morbid obesity, HTN POD 6 s/p conversion of sleeve (2012) to gastric bypass presented to ED with diffuse abdominal pain that increased in severity over the past 2 days. Pt reports that she has had pain since her surgery but it increased in severity two days ago despite taking percocet. Primarily exacerbated by movement. No nausea/vomiting/diarrhea/fevers or chills.

## 2018-01-29 NOTE — ED ADULT TRIAGE NOTE - CHIEF COMPLAINT QUOTE
Patient states, "I had gastric bypass surgery last week with Dr. Leggett and he told me to come in because the pain isn't going away."

## 2018-01-29 NOTE — H&P ADULT - NSHPLABSRESULTS_GEN_ALL_CORE
NTERPRETATION:  ATTENDING OVER READ:    AGREE WITH BELOW REPORT WITH THE FOLLOWING ADDITIONS:    1. Post gastric sleeve procedure.  2. Small type I hiatal hernia with mural thickening of the distal   esophagus which may be related to reflux disease. Endoscopic correlation.  3. There is mural thickening involving the distal jejunum (image [id number],   series 3) in the region of a surgical suture line. There is an incomplete   mild small bowel obstruction pattern secondary to marked mural thickening   in the region of a surgical anastomotic suture site in the region of the   left lower abdomen anteriorly. A discrete transition point is not   identified. There may be adherence of the small bowel to the anterior   abdominal wall as well as acute angulations of the small bowel loops to   suggest the presence of adhesions. Vascular compromise of the small bowel   wall cannot be excluded however there is no evidence of intestinal   pneumatosis or portal venous gas. Surgical consult is advised.     Preliminary Radiology Report  Call: [phone]  assistance Online chat: [email or web address]  Patient Name: EVARISTO BERMAN MRN: [id number]   (Age): 1980 37 Gender: F  Date of Exam: 2018 Accession: [id number]  Referring Physician: Hernán Pablo # of Images: [id number]  Ordered As: CT ABDOMEN/PELVIS WO  Page 1 of 2  EXAM:  CT Abdomen and Pelvis With Intravenous Contrast  EXAM DATE/TIME:  2018 6:49 PM    CLINICAL HISTORY:  37 years old, female; Pain; Abdominal pain  TECHNIQUE:  Axial computed tomography images of the abdomen and pelvis with   intravenous contrast.  Coronal and sagittal reformatted images were created and reviewed.  COMPARISON:  No relevant prior studies available.    FINDINGS:  Lower thorax: Mild distal esophageal wall thickening, suggestive of   esophagitis. Correlate clinically.  ABDOMEN:  Liver: Diffuse decrease in hepatic parenchymal density, consistent with   fatty infiltration.  Gallbladder and bile ducts: Gallbladder surgically absent. No ductal   dilation.  Pancreas: Unremarkable. No mass. No ductal dilation.  Spleen: Unremarkable. No splenomegaly.  Adrenals: Unremarkable. No mass.  Kidneys and ureters: Unremarkable. No solid mass. No hydronephrosis.  Stomach and bowel: Mild diffuse wall thickening of the stomach suggestive   of a nonspecific gastritis,  versus artifact related to underdistention. Correlate clinically.   Mild-to-moderate wall thickening of the  proximal small bowel in the region of the small bowel anastomosis   suggestive of a nonspecific  enteritis. Correlate clinically. Previous gastric stapling and bypass.   Colonic diverticula present.  Appendix: No findings to suggest acute appendicitis.  PELVIS:  Bladder: Unremarkable. No mass.  Reproductive: Unremarkable as visualized.  ABDOMEN and PELVIS:  Intraperitoneal space: Unremarkable. No free air. No significant fluid   collection.  Bones/joints: No acute fracture. No dislocation.  Soft tissues: Unremarkable.  Vasculature: Unremarkable. No abdominal aortic aneurysm.  Lymph nodes: Unremarkable. No enlarged lymph nodes.    IMPRESSION:  1. Mild distal esophageal wall thickening, suggestive of esophagitis.   Correlate clinically.  2. Mild diffuse wall thickening of the stomach suggestive of a   nonspecific gastritis, versus artifact  related to underdistention. Correlate clinically.  3. Mild-to-moderate wall thickening of the proximal small bowel in the   region of the small bowel  anastomosis suggestive of a nonspecific enteritis. Correlate clinically.

## 2018-01-29 NOTE — H&P ADULT - ASSESSMENT
38 yo F with h/o GERD, morbid obesity, HTN POD 6 s/p conversion of sleeve (2012) to gastric bypass with diffuse persistent abdominal pain  -admit to regional  -BCLD  -pain control  -protonix  -carafate  -HSQ/SCDs  -discussed with Dr Leggett

## 2018-01-29 NOTE — ED PROVIDER NOTE - ATTENDING CONTRIBUTION TO CARE
Pt is a 38yo f, h/o sleeve gastrectomy, s/p elective bypass last week, who p/w generalized abd pain, worst to lower abd ever since. No fever. No urinary sx's. No n/v/d/c.  Afebrile. HDS. WNWD f in mild painful distress. + s1, s2, rrr. Lungs cta b/l. Abd soft, non-distended, + ecchymosis to mid abd, surgical incision sites c/d/i, + generalized tenderness. No cvat. + leukocytosis to 16, anemia. Slightly low bicab w/ ag 20, likely 2/2 dehydration. Pt ordered for ivf, morphine, ct a/p. Surgical consult requested for evaluation. Dispo pending ct result and pt re-evaluation.

## 2018-01-29 NOTE — H&P ADULT - NSHPPHYSICALEXAM_GEN_ALL_CORE
Gen: AOx3, NAD    CV: RRR, no m/r/g    Pulm: CTABL, no resp distress    Abdomen: soft, tender diffusely, bruising at incision site and mid abdomen    Ext: WWP, no edema

## 2018-01-29 NOTE — ED PROVIDER NOTE - OBJECTIVE STATEMENT
The pt is a 38 y/o F, who presents to ED c/o diffuse abd pain x 2 d. Pt is s/p gastric bypass (converted from gastric sleeve) on 1/23 by dr phillips, states that has been taking her pain meds and doing well until 2 d ago - pain diffuse, 9/10, constant, varies based on activity and position, no relief w/percocet at this point. Denies fevers, chills, n/v/d, cp, sob, dysuria, syncope

## 2018-01-29 NOTE — ED ADULT NURSE NOTE - CHPI ED SYMPTOMS POS
TENDERNESS/PAIN/abd surgery sleeve conversion to gastric bypass on 1/23/18 with progressively worsening pain since the surgery

## 2018-01-30 LAB
ANION GAP SERPL CALC-SCNC: 15 MMOL/L — SIGNIFICANT CHANGE UP (ref 5–17)
ANION GAP SERPL CALC-SCNC: 18 MMOL/L — HIGH (ref 5–17)
BUN SERPL-MCNC: 5 MG/DL — LOW (ref 7–23)
BUN SERPL-MCNC: 6 MG/DL — LOW (ref 7–23)
CALCIUM SERPL-MCNC: 8.3 MG/DL — LOW (ref 8.4–10.5)
CALCIUM SERPL-MCNC: 8.7 MG/DL — SIGNIFICANT CHANGE UP (ref 8.4–10.5)
CHLORIDE SERPL-SCNC: 104 MMOL/L — SIGNIFICANT CHANGE UP (ref 96–108)
CHLORIDE SERPL-SCNC: 99 MMOL/L — SIGNIFICANT CHANGE UP (ref 96–108)
CO2 SERPL-SCNC: 20 MMOL/L — LOW (ref 22–31)
CO2 SERPL-SCNC: 20 MMOL/L — LOW (ref 22–31)
CREAT SERPL-MCNC: 0.59 MG/DL — SIGNIFICANT CHANGE UP (ref 0.5–1.3)
CREAT SERPL-MCNC: 0.62 MG/DL — SIGNIFICANT CHANGE UP (ref 0.5–1.3)
GLUCOSE SERPL-MCNC: 79 MG/DL — SIGNIFICANT CHANGE UP (ref 70–99)
GLUCOSE SERPL-MCNC: 82 MG/DL — SIGNIFICANT CHANGE UP (ref 70–99)
HCT VFR BLD CALC: 27 % — LOW (ref 34.5–45)
HGB BLD-MCNC: 8.5 G/DL — LOW (ref 11.5–15.5)
MAGNESIUM SERPL-MCNC: 1.8 MG/DL — SIGNIFICANT CHANGE UP (ref 1.6–2.6)
MAGNESIUM SERPL-MCNC: 2.3 MG/DL — SIGNIFICANT CHANGE UP (ref 1.6–2.6)
MCHC RBC-ENTMCNC: 23 PG — LOW (ref 27–34)
MCHC RBC-ENTMCNC: 31.5 G/DL — LOW (ref 32–36)
MCV RBC AUTO: 73 FL — LOW (ref 80–100)
PHOSPHATE SERPL-MCNC: 3 MG/DL — SIGNIFICANT CHANGE UP (ref 2.5–4.5)
PHOSPHATE SERPL-MCNC: 3.2 MG/DL — SIGNIFICANT CHANGE UP (ref 2.5–4.5)
PLATELET # BLD AUTO: 225 K/UL — SIGNIFICANT CHANGE UP (ref 150–400)
POTASSIUM SERPL-MCNC: 3.3 MMOL/L — LOW (ref 3.5–5.3)
POTASSIUM SERPL-MCNC: 3.8 MMOL/L — SIGNIFICANT CHANGE UP (ref 3.5–5.3)
POTASSIUM SERPL-SCNC: 3.3 MMOL/L — LOW (ref 3.5–5.3)
POTASSIUM SERPL-SCNC: 3.8 MMOL/L — SIGNIFICANT CHANGE UP (ref 3.5–5.3)
RBC # BLD: 3.7 M/UL — LOW (ref 3.8–5.2)
RBC # FLD: 18.4 % — HIGH (ref 10.3–16.9)
SODIUM SERPL-SCNC: 137 MMOL/L — SIGNIFICANT CHANGE UP (ref 135–145)
SODIUM SERPL-SCNC: 139 MMOL/L — SIGNIFICANT CHANGE UP (ref 135–145)
WBC # BLD: 7.4 K/UL — SIGNIFICANT CHANGE UP (ref 3.8–10.5)
WBC # FLD AUTO: 7.4 K/UL — SIGNIFICANT CHANGE UP (ref 3.8–10.5)

## 2018-01-30 RX ORDER — POTASSIUM CHLORIDE 20 MEQ
20 PACKET (EA) ORAL ONCE
Qty: 0 | Refills: 0 | Status: COMPLETED | OUTPATIENT
Start: 2018-01-30 | End: 2018-01-30

## 2018-01-30 RX ORDER — MAGNESIUM SULFATE 500 MG/ML
1 VIAL (ML) INJECTION ONCE
Qty: 0 | Refills: 0 | Status: COMPLETED | OUTPATIENT
Start: 2018-01-30 | End: 2018-01-30

## 2018-01-30 RX ORDER — POTASSIUM CHLORIDE 20 MEQ
10 PACKET (EA) ORAL
Qty: 0 | Refills: 0 | Status: COMPLETED | OUTPATIENT
Start: 2018-01-30 | End: 2018-01-30

## 2018-01-30 RX ORDER — HEPARIN SODIUM 5000 [USP'U]/ML
5000 INJECTION INTRAVENOUS; SUBCUTANEOUS EVERY 8 HOURS
Qty: 0 | Refills: 0 | Status: DISCONTINUED | OUTPATIENT
Start: 2018-01-30 | End: 2018-01-31

## 2018-01-30 RX ADMIN — Medication 100 MILLIEQUIVALENT(S): at 09:33

## 2018-01-30 RX ADMIN — Medication 1 GRAM(S): at 12:45

## 2018-01-30 RX ADMIN — Medication 20 MILLIEQUIVALENT(S): at 21:00

## 2018-01-30 RX ADMIN — OXYCODONE AND ACETAMINOPHEN 2 TABLET(S): 5; 325 TABLET ORAL at 18:35

## 2018-01-30 RX ADMIN — Medication 100 GRAM(S): at 15:55

## 2018-01-30 RX ADMIN — Medication 100 MILLIEQUIVALENT(S): at 11:24

## 2018-01-30 RX ADMIN — HEPARIN SODIUM 5000 UNIT(S): 5000 INJECTION INTRAVENOUS; SUBCUTANEOUS at 15:55

## 2018-01-30 RX ADMIN — Medication 100 MILLIEQUIVALENT(S): at 13:15

## 2018-01-30 RX ADMIN — OXYCODONE AND ACETAMINOPHEN 2 TABLET(S): 5; 325 TABLET ORAL at 13:12

## 2018-01-30 RX ADMIN — PANTOPRAZOLE SODIUM 40 MILLIGRAM(S): 20 TABLET, DELAYED RELEASE ORAL at 07:01

## 2018-01-30 RX ADMIN — OXYCODONE AND ACETAMINOPHEN 2 TABLET(S): 5; 325 TABLET ORAL at 13:40

## 2018-01-30 RX ADMIN — HEPARIN SODIUM 5000 UNIT(S): 5000 INJECTION INTRAVENOUS; SUBCUTANEOUS at 21:00

## 2018-01-30 RX ADMIN — OXYCODONE AND ACETAMINOPHEN 2 TABLET(S): 5; 325 TABLET ORAL at 19:05

## 2018-01-30 NOTE — PROGRESS NOTE ADULT - SUBJECTIVE AND OBJECTIVE BOX
O/N: admitted POD 6 s/p conversion of sleeve (2012) to gastric bypass with diffuse persistent abdominal pain, WBC 16, VSS O/N: admitted POD 6 s/p conversion of sleeve (2012) to gastric bypass with diffuse persistent abdominal pain, WBC 16, VSS    OVERNIGHT EVENTS:    STATUS POST:      POST OPERATIVE DAY #:     SUBJECTIVE:  Flatus: [] YES [] NO             Bowel Movement: [ ] YES [ ] NO  Pain (0-10):            Pain Control Adequate: [ ] YES [ ] NO  Nausea: [ ] YES [ ] NO            Vomiting: [ ] YES [ ] NO  Diarrhea: [ ] YES [ ] NO         Constipation: [ ] YES [ ] NO     Chest Pain: [ ] YES [ ] NO    SOB:  [ ] YES [ ] NO        Vital Signs Last 24 Hrs  T(C): 36.9 (30 Jan 2018 05:22), Max: 37.1 (29 Jan 2018 20:08)  T(F): 98.4 (30 Jan 2018 05:22), Max: 98.8 (29 Jan 2018 21:30)  HR: 76 (30 Jan 2018 05:22) (76 - 108)  BP: 97/60 (30 Jan 2018 05:22) (97/60 - 122/80)  BP(mean): 72 (30 Jan 2018 05:22) (72 - 72)  RR: 15 (30 Jan 2018 05:22) (15 - 18)  SpO2: 98% (30 Jan 2018 05:22) (98% - 100%)  I&O's Detail      General: NAD, resting comfortably in bed  C/V: NSR  Pulm: Nonlabored breathing, no respiratory distress  Abd: soft, non-distended , diffuse abdominal tenderness to palpation   Extrem: WWP, no edema, SCDs in place        LABS:                        8.5    7.4   )-----------( 225      ( 30 Jan 2018 05:49 )             27.0     01-30    139  |  104  |  6<L>  ----------------------------<  79  3.3<L>   |  20<L>  |  0.62    Ca    8.3<L>      30 Jan 2018 05:49  Phos  3.2     01-30  Mg     1.8     01-30    TPro  7.7  /  Alb  4.1  /  TBili  0.6  /  DBili  x   /  AST  20  /  ALT  27  /  AlkPhos  50  01-29 OVERNIGHT EVENTS:  admitted POD 6 s/p conversion of sleeve (2012) to gastric bypass with diffuse persistent abdominal pain, WBC 16, VSS        SUBJECTIVE: Patient examined bedside complains of diffuse abdominal pain  Flatus: [X] YES [S] NO             Bowel Movement: [ X] YES [ ] NO  Pain (0-10):            Pain Control Adequate: [S ] YES [ ] NO  Nausea: [ ] YES [X ] NO            Vomiting: [ ] YES [X ] NO  Diarrhea: [ ] YES [X ] NO         Constipation: [ ] YES [ X] NO     Chest Pain: [ ] YES [X ] NO    SOB:  [ ] YES [X ] NO        Vital Signs Last 24 Hrs  T(C): 36.9 (30 Jan 2018 05:22), Max: 37.1 (29 Jan 2018 20:08)  T(F): 98.4 (30 Jan 2018 05:22), Max: 98.8 (29 Jan 2018 21:30)  HR: 76 (30 Jan 2018 05:22) (76 - 108)  BP: 97/60 (30 Jan 2018 05:22) (97/60 - 122/80)  BP(mean): 72 (30 Jan 2018 05:22) (72 - 72)  RR: 15 (30 Jan 2018 05:22) (15 - 18)  SpO2: 98% (30 Jan 2018 05:22) (98% - 100%)  I&O's Detail      General: NAD, resting comfortably in bed  C/V: NSR  Pulm: Nonlabored breathing, no respiratory distress  Abd: soft, non-distended , diffuse abdominal tenderness to palpation   Extrem: WWP, no edema, SCDs in place        LABS:                        8.5    7.4   )-----------( 225      ( 30 Jan 2018 05:49 )             27.0     01-30    139  |  104  |  6<L>  ----------------------------<  79  3.3<L>   |  20<L>  |  0.62    Ca    8.3<L>      30 Jan 2018 05:49  Phos  3.2     01-30  Mg     1.8     01-30    TPro  7.7  /  Alb  4.1  /  TBili  0.6  /  DBili  x   /  AST  20  /  ALT  27  /  AlkPhos  50  01-29

## 2018-01-30 NOTE — PROGRESS NOTE ADULT - ASSESSMENT
36 yo F with h/o GERD, morbid obesity, HTN POD 6 s/p conversion of sleeve (2012) to gastric bypass with diffuse persistent abdominal pain  -BCLD  -pain/nausea control  -protonix  -carafate  -HSQ/SCDs/OOBA

## 2018-01-31 ENCOUNTER — TRANSCRIPTION ENCOUNTER (OUTPATIENT)
Age: 38
End: 2018-01-31

## 2018-01-31 VITALS
DIASTOLIC BLOOD PRESSURE: 82 MMHG | HEART RATE: 65 BPM | SYSTOLIC BLOOD PRESSURE: 99 MMHG | OXYGEN SATURATION: 99 % | RESPIRATION RATE: 16 BRPM | TEMPERATURE: 98 F

## 2018-01-31 LAB
ANION GAP SERPL CALC-SCNC: 19 MMOL/L — HIGH (ref 5–17)
BUN SERPL-MCNC: 5 MG/DL — LOW (ref 7–23)
CALCIUM SERPL-MCNC: 8.6 MG/DL — SIGNIFICANT CHANGE UP (ref 8.4–10.5)
CHLORIDE SERPL-SCNC: 101 MMOL/L — SIGNIFICANT CHANGE UP (ref 96–108)
CO2 SERPL-SCNC: 17 MMOL/L — LOW (ref 22–31)
CREAT SERPL-MCNC: 0.59 MG/DL — SIGNIFICANT CHANGE UP (ref 0.5–1.3)
GLUCOSE SERPL-MCNC: 68 MG/DL — LOW (ref 70–99)
HCT VFR BLD CALC: 27.6 % — LOW (ref 34.5–45)
HGB BLD-MCNC: 8.3 G/DL — LOW (ref 11.5–15.5)
MAGNESIUM SERPL-MCNC: 1.9 MG/DL — SIGNIFICANT CHANGE UP (ref 1.6–2.6)
MCHC RBC-ENTMCNC: 22.8 PG — LOW (ref 27–34)
MCHC RBC-ENTMCNC: 30.1 G/DL — LOW (ref 32–36)
MCV RBC AUTO: 75.8 FL — LOW (ref 80–100)
PHOSPHATE SERPL-MCNC: 3.6 MG/DL — SIGNIFICANT CHANGE UP (ref 2.5–4.5)
PLATELET # BLD AUTO: 201 K/UL — SIGNIFICANT CHANGE UP (ref 150–400)
POTASSIUM SERPL-MCNC: 3.8 MMOL/L — SIGNIFICANT CHANGE UP (ref 3.5–5.3)
POTASSIUM SERPL-SCNC: 3.8 MMOL/L — SIGNIFICANT CHANGE UP (ref 3.5–5.3)
RBC # BLD: 3.64 M/UL — LOW (ref 3.8–5.2)
RBC # FLD: 18.5 % — HIGH (ref 10.3–16.9)
SODIUM SERPL-SCNC: 137 MMOL/L — SIGNIFICANT CHANGE UP (ref 135–145)
WBC # BLD: 4.7 K/UL — SIGNIFICANT CHANGE UP (ref 3.8–10.5)
WBC # FLD AUTO: 4.7 K/UL — SIGNIFICANT CHANGE UP (ref 3.8–10.5)

## 2018-01-31 RX ORDER — PREGABALIN 225 MG/1
1000 CAPSULE ORAL DAILY
Qty: 0 | Refills: 0 | Status: DISCONTINUED | OUTPATIENT
Start: 2018-01-31 | End: 2018-01-31

## 2018-01-31 RX ORDER — CALCIUM CARBONATE 500(1250)
1 TABLET ORAL DAILY
Qty: 0 | Refills: 0 | Status: DISCONTINUED | OUTPATIENT
Start: 2018-01-31 | End: 2018-01-31

## 2018-01-31 RX ORDER — PREGABALIN 225 MG/1
1 CAPSULE ORAL
Qty: 0 | Refills: 0 | DISCHARGE
Start: 2018-01-31

## 2018-01-31 RX ORDER — OXYCODONE HYDROCHLORIDE 5 MG/1
1 TABLET ORAL
Qty: 24 | Refills: 0
Start: 2018-01-31 | End: 2018-02-05

## 2018-01-31 RX ADMIN — OXYCODONE AND ACETAMINOPHEN 2 TABLET(S): 5; 325 TABLET ORAL at 16:39

## 2018-01-31 RX ADMIN — OXYCODONE AND ACETAMINOPHEN 2 TABLET(S): 5; 325 TABLET ORAL at 00:35

## 2018-01-31 RX ADMIN — HEPARIN SODIUM 5000 UNIT(S): 5000 INJECTION INTRAVENOUS; SUBCUTANEOUS at 14:33

## 2018-01-31 RX ADMIN — OXYCODONE AND ACETAMINOPHEN 2 TABLET(S): 5; 325 TABLET ORAL at 09:26

## 2018-01-31 RX ADMIN — OXYCODONE AND ACETAMINOPHEN 2 TABLET(S): 5; 325 TABLET ORAL at 09:59

## 2018-01-31 RX ADMIN — HEPARIN SODIUM 5000 UNIT(S): 5000 INJECTION INTRAVENOUS; SUBCUTANEOUS at 06:51

## 2018-01-31 RX ADMIN — OXYCODONE AND ACETAMINOPHEN 2 TABLET(S): 5; 325 TABLET ORAL at 01:35

## 2018-01-31 RX ADMIN — PANTOPRAZOLE SODIUM 40 MILLIGRAM(S): 20 TABLET, DELAYED RELEASE ORAL at 06:51

## 2018-01-31 NOTE — DIETITIAN INITIAL EVALUATION ADULT. - NS AS NUTRI INTERV ED CONTENT
Reviewed bariatric diet progression, importance of adequate protein, hydration, and compliance with vitamin supplements.  Pt expressed positive, verbal understanding; expected compliance is good./Purpose of the nutrition education

## 2018-01-31 NOTE — DISCHARGE NOTE ADULT - CARE PROVIDER_API CALL
Sam Leggett (MD), Surgery  [address]  1[address]  [place], [place] [address]  Phone: [phone]  Fax: [phone]

## 2018-01-31 NOTE — DISCHARGE NOTE ADULT - MEDICATION SUMMARY - MEDICATIONS TO STOP TAKING
I will STOP taking the medications listed below when I get home from the hospital:    Percocet 5/325 oral tablet  -- 1 tab(s) by mouth every 6 hours, As Needed -Severe Pain (7 - 10) MDD:4

## 2018-01-31 NOTE — PROGRESS NOTE ADULT - SUBJECTIVE AND OBJECTIVE BOX
O/N: WILLY, VSS, K repleted, OOBA  1/30: ice pack placed on abdomen , Pain improved. Passing flatus, abd exam improved. OOB / Ambulating OVERNIGHT EVENTS:  WILLY, VSS, K repleted, OOBA  1/30: ice pack placed on abdomen , Pain improved. Passing flatus, abd exam improved. OOB / Ambulating    SUBJECTIVE: Patient examined bedside reports pain improving   Flatus: [] YES [x] NO             Bowel Movement: [ ] YES [x ] NO  Pain (0-10):            Pain Control Adequate: [x ] YES [ ] NO  Nausea: [ ] YES [x ] NO            Vomiting: [ ] YES [x] NO  Diarrhea: [ ] YES [x ] NO         Constipation: [ ] YES [x ] NO     Chest Pain: [ ] YES [x ] NO    SOB:  [ ] YES [ x] NO    heparin  Injectable 5000 Unit(s) SubCutaneous every 8 hours      Vital Signs Last 24 Hrs  T(C): 36.7 (31 Jan 2018 05:52), Max: 37.1 (30 Jan 2018 20:46)  T(F): 98 (31 Jan 2018 05:52), Max: 98.7 (30 Jan 2018 20:46)  HR: 77 (31 Jan 2018 05:52) (73 - 84)  BP: 90/56 (31 Jan 2018 05:52) (90/56 - 96/65)  BP(mean): 62 (30 Jan 2018 20:46) (62 - 62)  RR: 16 (31 Jan 2018 05:52) (16 - 17)  SpO2: 98% (31 Jan 2018 05:52) (97% - 98%)  I&O's Detail    30 Jan 2018 07:01  -  31 Jan 2018 07:00  --------------------------------------------------------  IN:    Oral Fluid: 620 mL  Total IN: 620 mL    OUT:  Total OUT: 0 mL    Total NET: 620 mL          General: NAD, resting comfortably in bed  C/V: NSR  Pulm: Nonlabored breathing, no respiratory distress  Abd: soft, non distended , TTP around incision sites left > right.  Extrem: WWP, no edema, SCDs in place        LABS:                        8.3    4.7   )-----------( 201      ( 31 Jan 2018 06:19 )             27.6     01-30    137  |  99  |  5<L>  ----------------------------<  82  3.8   |  20<L>  |  0.59    Ca    8.7      30 Jan 2018 17:16  Phos  3.0     01-30  Mg     2.3     01-30    TPro  7.7  /  Alb  4.1  /  TBili  0.6  /  DBili  x   /  AST  20  /  ALT  27  /  AlkPhos  50  01-29

## 2018-01-31 NOTE — PROGRESS NOTE ADULT - ASSESSMENT
36 yo F with h/o GERD, morbid obesity, HTN POD 6 s/p conversion of sleeve (2012) to gastric bypass with diffuse persistent abdominal pain  -BCLD  -pain/nausea control  -protonix  -carafate  -AM labs  -HSQ/SCDs/OOBA

## 2018-01-31 NOTE — DISCHARGE NOTE ADULT - CARE PLAN
Principal Discharge DX:	Abdominal pain  Goal:	recovery  Assessment and plan of treatment:	Follow up with  in 1 week. Call the office at  to schedule your appointment.  You may shower; soap and water over incision sites. Do not scrub. Pat dry when done. No tub bathing or swimming until cleared. Keep incision sites out of the sun as scars will darken. No heavy lifting (>10lbs) or strenuous exercise. Diet: Bariatric Full Fluids. 60 grams protein daily.  64 fluid ounces water daily. Drink small sips throughout the day. Continue diet as outlined by paperwork received as a pre-operative patient. You should be urinating at least 3-4x per day. Call the office if you experience increasing abdominal pain, nausea, vomiting, or temperature >100.4F.  NO ASPIRIN OR NSAIDs until approved by Dr. Leggett. Avoid alcoholic beverages until cleared by Dr. Leggett.     You should take 650mg Tylenol every 4 hours for the next 3-4 days and then as needed. You may take 1 or 2 oxycodone every 4-6hours as needed for severe pain.

## 2018-01-31 NOTE — DISCHARGE NOTE ADULT - HOSPITAL COURSE
HPI:  38 yo F with h/o GERD, morbid obesity, HTN POD 6 s/p conversion of sleeve (2012) to gastric bypass presented to ED with diffuse abdominal pain that increased in severity over the past 2 days. Pt reports that she has had pain since her surgery but it increased in severity two days ago despite taking percocet. Primarily exacerbated by movement. No nausea/vomiting/diarrhea/fevers or chills.   She was admitted to the general surgery service. Her pain continued to improve with time and pain medications. She has remained afebrile, hemodynamically stable, is tolerating a diet and having bowel function, with pain well controlled on oral medications. She is currently ready for discharge.

## 2018-01-31 NOTE — DISCHARGE NOTE ADULT - PLAN OF CARE
recovery Follow up with  in 1 week. Call the office at  to schedule your appointment.  You may shower; soap and water over incision sites. Do not scrub. Pat dry when done. No tub bathing or swimming until cleared. Keep incision sites out of the sun as scars will darken. No heavy lifting (>10lbs) or strenuous exercise. Diet: Bariatric Full Fluids. 60 grams protein daily.  64 fluid ounces water daily. Drink small sips throughout the day. Continue diet as outlined by paperwork received as a pre-operative patient. You should be urinating at least 3-4x per day. Call the office if you experience increasing abdominal pain, nausea, vomiting, or temperature >100.4F.  NO ASPIRIN OR NSAIDs until approved by Dr. Leggett. Avoid alcoholic beverages until cleared by Dr. Leggett.     You should take 650mg Tylenol every 4 hours for the next 3-4 days and then as needed. You may take 1 or 2 oxycodone every 4-6hours as needed for severe pain.

## 2018-01-31 NOTE — DISCHARGE NOTE ADULT - PATIENT PORTAL LINK FT
“You can access the FollowHealth Patient Portal, offered by [facility], by registering with the following website: [email or web address]”

## 2018-01-31 NOTE — DIETITIAN INITIAL EVALUATION ADULT. - OTHER INFO
38yo F s/p conversion of sleeve (2012) to gastric bypass POD6, readmitted w/ persistent abdominal pain. Pt currently on [id number] diet. Was on BARICLLIQ this am at time of assessment and tolerating well. At home pt has been adhering to full liquid diet w/ implementation of yogurt, strained bean soups and nut butters. Has 2 protein drinks/day (60g protein). Denies N/V/D/C. Reviewed current diet phase of the bariatric diet advancement process. Noted wt prior to Sx 1/23 was 175lb, current BW is 168lb indicating a 4% wt change in 6 days. Skin and GI WDL per flowsheet.

## 2018-01-31 NOTE — DISCHARGE NOTE ADULT - MEDICATION SUMMARY - MEDICATIONS TO TAKE
I will START or STAY ON the medications listed below when I get home from the hospital:    Oxaydo 5 mg oral tablet  -- 1 tab(s) by mouth every 6 hours, As Needed  -for severe pain MDD:8   -- Caution federal law prohibits the transfer of this drug to any person other  than the person for whom it was prescribed.  It is very important that you take or use this exactly as directed.  Do not skip doses or discontinue unless directed by your doctor.  May cause drowsiness.  Alcohol may intensify this effect.  Use care when operating dangerous machinery.  This prescription cannot be refilled.  Using more of this medication than prescribed may cause serious breathing problems.    -- Indication: For severe pain    Colace 100 mg oral capsule  -- 1 cap(s) by mouth 2 times a day, As Needed -for constipation   -- Medication should be taken with plenty of water.    -- Indication: For constipation    omeprazole 40 mg oral delayed release capsule  -- 1 cap(s) by mouth once a day   -- It is very important that you take or use this exactly as directed.  Do not skip doses or discontinue unless directed by your doctor.  Obtain medical advice before taking any non-prescription drugs as some may affect the action of this medication.  Swallow whole.  Do not crush.    -- Indication: For GI surgery    Chantix 0.5 mg oral tablet  -- orally once a day  -- Indication: For Smoking cessation    Multiple Vitamins oral tablet, chewable  -- 1 tab(s) by mouth once a day  -- Indication: For Nutritionist rec    cyanocobalamin 1000 mcg oral tablet  -- 1 tab(s) by mouth once a day  -- Indication: For nutrictionist rec

## 2018-02-01 RX ORDER — OXYCODONE HYDROCHLORIDE 5 MG/1
1 TABLET ORAL
Qty: 20 | Refills: 0 | OUTPATIENT
Start: 2018-02-01 | End: 2018-02-05

## 2018-02-03 LAB
CULTURE RESULTS: SIGNIFICANT CHANGE UP
CULTURE RESULTS: SIGNIFICANT CHANGE UP
SPECIMEN SOURCE: SIGNIFICANT CHANGE UP
SPECIMEN SOURCE: SIGNIFICANT CHANGE UP

## 2018-02-08 ENCOUNTER — APPOINTMENT (OUTPATIENT)
Dept: BARIATRICS | Facility: CLINIC | Age: 38
End: 2018-02-08
Payer: MEDICAID

## 2018-02-08 VITALS
OXYGEN SATURATION: 97 % | DIASTOLIC BLOOD PRESSURE: 81 MMHG | HEART RATE: 67 BPM | SYSTOLIC BLOOD PRESSURE: 113 MMHG | BODY MASS INDEX: 25.9 KG/M2 | WEIGHT: 165 LBS | HEIGHT: 67 IN | TEMPERATURE: 97.4 F

## 2018-02-08 DIAGNOSIS — I10 ESSENTIAL (PRIMARY) HYPERTENSION: ICD-10-CM

## 2018-02-08 DIAGNOSIS — E66.9 OBESITY, UNSPECIFIED: ICD-10-CM

## 2018-02-08 DIAGNOSIS — R10.84 GENERALIZED ABDOMINAL PAIN: ICD-10-CM

## 2018-02-08 DIAGNOSIS — G89.18 OTHER ACUTE POSTPROCEDURAL PAIN: ICD-10-CM

## 2018-02-08 DIAGNOSIS — R73.03 PREDIABETES: ICD-10-CM

## 2018-02-08 DIAGNOSIS — E86.0 DEHYDRATION: ICD-10-CM

## 2018-02-08 PROCEDURE — 99024 POSTOP FOLLOW-UP VISIT: CPT

## 2018-04-05 ENCOUNTER — APPOINTMENT (OUTPATIENT)
Dept: BARIATRICS | Facility: CLINIC | Age: 38
End: 2018-04-05
Payer: MEDICAID

## 2018-04-05 VITALS
DIASTOLIC BLOOD PRESSURE: 77 MMHG | WEIGHT: 155.13 LBS | HEART RATE: 79 BPM | HEIGHT: 67 IN | SYSTOLIC BLOOD PRESSURE: 143 MMHG | OXYGEN SATURATION: 100 % | BODY MASS INDEX: 24.35 KG/M2 | TEMPERATURE: 97.9 F

## 2018-04-05 DIAGNOSIS — K21.9 GASTRO-ESOPHAGEAL REFLUX DISEASE W/OUT ESOPHAGITIS: ICD-10-CM

## 2018-04-05 PROCEDURE — 99024 POSTOP FOLLOW-UP VISIT: CPT

## 2018-04-05 RX ORDER — OMEPRAZOLE 40 MG/1
40 CAPSULE, DELAYED RELEASE ORAL
Qty: 30 | Refills: 3 | Status: ACTIVE | COMMUNITY
Start: 2018-04-05 | End: 1900-01-01

## 2018-04-10 ENCOUNTER — APPOINTMENT (OUTPATIENT)
Dept: CT IMAGING | Facility: HOSPITAL | Age: 38
End: 2018-04-10
Payer: MEDICAID

## 2018-04-10 ENCOUNTER — OUTPATIENT (OUTPATIENT)
Dept: OUTPATIENT SERVICES | Facility: HOSPITAL | Age: 38
LOS: 1 days | End: 2018-04-10
Payer: MEDICAID

## 2018-04-10 ENCOUNTER — TRANSCRIPTION ENCOUNTER (OUTPATIENT)
Age: 38
End: 2018-04-10

## 2018-04-10 DIAGNOSIS — Z41.9 ENCOUNTER FOR PROCEDURE FOR PURPOSES OTHER THAN REMEDYING HEALTH STATE, UNSPECIFIED: Chronic | ICD-10-CM

## 2018-04-10 DIAGNOSIS — Z98.84 BARIATRIC SURGERY STATUS: Chronic | ICD-10-CM

## 2018-04-10 DIAGNOSIS — Z98.890 OTHER SPECIFIED POSTPROCEDURAL STATES: Chronic | ICD-10-CM

## 2018-04-10 PROCEDURE — 74177 CT ABD & PELVIS W/CONTRAST: CPT

## 2018-04-10 PROCEDURE — 74177 CT ABD & PELVIS W/CONTRAST: CPT | Mod: 26

## 2018-04-26 ENCOUNTER — APPOINTMENT (OUTPATIENT)
Dept: BARIATRICS | Facility: CLINIC | Age: 38
End: 2018-04-26
Payer: MEDICAID

## 2018-04-26 VITALS
TEMPERATURE: 98.4 F | WEIGHT: 152 LBS | DIASTOLIC BLOOD PRESSURE: 67 MMHG | HEART RATE: 67 BPM | SYSTOLIC BLOOD PRESSURE: 106 MMHG | OXYGEN SATURATION: 99 % | HEIGHT: 67 IN | BODY MASS INDEX: 23.86 KG/M2

## 2018-04-26 DIAGNOSIS — Z98.84 BARIATRIC SURGERY STATUS: ICD-10-CM

## 2018-04-26 DIAGNOSIS — R10.13 EPIGASTRIC PAIN: ICD-10-CM

## 2018-04-26 PROCEDURE — 99213 OFFICE O/P EST LOW 20 MIN: CPT

## 2018-05-02 PROBLEM — R10.13 EPIGASTRIC ABDOMINAL PAIN OF UNKNOWN ETIOLOGY: Status: ACTIVE | Noted: 2018-04-05

## 2018-05-02 PROBLEM — Z98.84 S/P BARIATRIC SURGERY: Status: ACTIVE | Noted: 2017-08-24

## 2018-05-23 PROCEDURE — 99285 EMERGENCY DEPT VISIT HI MDM: CPT | Mod: 25

## 2018-05-23 PROCEDURE — 80048 BASIC METABOLIC PNL TOTAL CA: CPT

## 2018-05-23 PROCEDURE — 83605 ASSAY OF LACTIC ACID: CPT

## 2018-05-23 PROCEDURE — 80053 COMPREHEN METABOLIC PANEL: CPT

## 2018-05-23 PROCEDURE — 36415 COLL VENOUS BLD VENIPUNCTURE: CPT

## 2018-05-23 PROCEDURE — 85025 COMPLETE CBC W/AUTO DIFF WBC: CPT

## 2018-05-23 PROCEDURE — 74177 CT ABD & PELVIS W/CONTRAST: CPT

## 2018-05-23 PROCEDURE — 87040 BLOOD CULTURE FOR BACTERIA: CPT

## 2018-05-23 PROCEDURE — 96374 THER/PROPH/DIAG INJ IV PUSH: CPT | Mod: XU

## 2018-05-23 PROCEDURE — 85027 COMPLETE CBC AUTOMATED: CPT

## 2018-05-23 PROCEDURE — 96376 TX/PRO/DX INJ SAME DRUG ADON: CPT

## 2018-05-23 PROCEDURE — 83735 ASSAY OF MAGNESIUM: CPT

## 2018-05-23 PROCEDURE — 84100 ASSAY OF PHOSPHORUS: CPT

## 2019-06-21 ENCOUNTER — EMERGENCY (EMERGENCY)
Facility: HOSPITAL | Age: 39
LOS: 1 days | Discharge: HOME | End: 2019-06-21
Attending: EMERGENCY MEDICINE | Admitting: EMERGENCY MEDICINE
Payer: MEDICAID

## 2019-06-21 VITALS
DIASTOLIC BLOOD PRESSURE: 68 MMHG | OXYGEN SATURATION: 98 % | HEART RATE: 80 BPM | TEMPERATURE: 99 F | SYSTOLIC BLOOD PRESSURE: 103 MMHG | RESPIRATION RATE: 18 BRPM

## 2019-06-21 VITALS
DIASTOLIC BLOOD PRESSURE: 70 MMHG | TEMPERATURE: 98 F | WEIGHT: 154.98 LBS | RESPIRATION RATE: 18 BRPM | HEART RATE: 86 BPM | SYSTOLIC BLOOD PRESSURE: 122 MMHG | OXYGEN SATURATION: 98 %

## 2019-06-21 DIAGNOSIS — Z41.9 ENCOUNTER FOR PROCEDURE FOR PURPOSES OTHER THAN REMEDYING HEALTH STATE, UNSPECIFIED: Chronic | ICD-10-CM

## 2019-06-21 DIAGNOSIS — Z98.890 OTHER SPECIFIED POSTPROCEDURAL STATES: Chronic | ICD-10-CM

## 2019-06-21 DIAGNOSIS — Z98.84 BARIATRIC SURGERY STATUS: Chronic | ICD-10-CM

## 2019-06-21 LAB
ALBUMIN SERPL ELPH-MCNC: 4 G/DL — SIGNIFICANT CHANGE UP (ref 3.3–5)
ALP SERPL-CCNC: 95 U/L — SIGNIFICANT CHANGE UP (ref 40–120)
ALT FLD-CCNC: 11 U/L — SIGNIFICANT CHANGE UP (ref 10–45)
ANION GAP SERPL CALC-SCNC: 11 MMOL/L — SIGNIFICANT CHANGE UP (ref 5–17)
APTT BLD: 29.1 SEC — SIGNIFICANT CHANGE UP (ref 27.5–36.3)
AST SERPL-CCNC: 18 U/L — SIGNIFICANT CHANGE UP (ref 10–40)
BASOPHILS # BLD AUTO: 0.05 K/UL — SIGNIFICANT CHANGE UP (ref 0–0.2)
BASOPHILS # BLD AUTO: 0.15 K/UL — SIGNIFICANT CHANGE UP (ref 0–0.2)
BASOPHILS NFR BLD AUTO: 0.7 % — SIGNIFICANT CHANGE UP (ref 0–2)
BASOPHILS NFR BLD AUTO: 1.7 % — SIGNIFICANT CHANGE UP (ref 0–2)
BILIRUB SERPL-MCNC: 0.2 MG/DL — SIGNIFICANT CHANGE UP (ref 0.2–1.2)
BLD GP AB SCN SERPL QL: NEGATIVE — SIGNIFICANT CHANGE UP
BUN SERPL-MCNC: 6 MG/DL — LOW (ref 7–23)
CALCIUM SERPL-MCNC: 9.1 MG/DL — SIGNIFICANT CHANGE UP (ref 8.4–10.5)
CHLORIDE SERPL-SCNC: 106 MMOL/L — SIGNIFICANT CHANGE UP (ref 96–108)
CK MB CFR SERPL CALC: <1 NG/ML — SIGNIFICANT CHANGE UP (ref 0–6.7)
CO2 SERPL-SCNC: 24 MMOL/L — SIGNIFICANT CHANGE UP (ref 22–31)
CREAT SERPL-MCNC: 0.66 MG/DL — SIGNIFICANT CHANGE UP (ref 0.5–1.3)
EOSINOPHIL # BLD AUTO: 0.08 K/UL — SIGNIFICANT CHANGE UP (ref 0–0.5)
EOSINOPHIL # BLD AUTO: 0.19 K/UL — SIGNIFICANT CHANGE UP (ref 0–0.5)
EOSINOPHIL NFR BLD AUTO: 0.9 % — SIGNIFICANT CHANGE UP (ref 0–6)
EOSINOPHIL NFR BLD AUTO: 2.6 % — SIGNIFICANT CHANGE UP (ref 0–6)
EXTRA SST TUBE: SIGNIFICANT CHANGE UP
GLUCOSE SERPL-MCNC: 155 MG/DL — HIGH (ref 70–99)
HCT VFR BLD CALC: 29.8 % — LOW (ref 34.5–45)
HCT VFR BLD CALC: 30.3 % — LOW (ref 34.5–45)
HGB BLD-MCNC: 8.2 G/DL — LOW (ref 11.5–15.5)
HGB BLD-MCNC: 8.3 G/DL — LOW (ref 11.5–15.5)
IMM GRANULOCYTES NFR BLD AUTO: 0.3 % — SIGNIFICANT CHANGE UP (ref 0–1.5)
INR BLD: 1.06 — SIGNIFICANT CHANGE UP (ref 0.88–1.16)
LYMPHOCYTES # BLD AUTO: 2.53 K/UL — SIGNIFICANT CHANGE UP (ref 1–3.3)
LYMPHOCYTES # BLD AUTO: 29.6 % — SIGNIFICANT CHANGE UP (ref 13–44)
LYMPHOCYTES # BLD AUTO: 3 K/UL — SIGNIFICANT CHANGE UP (ref 1–3.3)
LYMPHOCYTES # BLD AUTO: 40.5 % — SIGNIFICANT CHANGE UP (ref 13–44)
MCHC RBC-ENTMCNC: 18.9 PG — LOW (ref 27–34)
MCHC RBC-ENTMCNC: 19.2 PG — LOW (ref 27–34)
MCHC RBC-ENTMCNC: 27.4 GM/DL — LOW (ref 32–36)
MCHC RBC-ENTMCNC: 27.5 GM/DL — LOW (ref 32–36)
MCV RBC AUTO: 68.7 FL — LOW (ref 80–100)
MCV RBC AUTO: 70 FL — LOW (ref 80–100)
MONOCYTES # BLD AUTO: 0.6 K/UL — SIGNIFICANT CHANGE UP (ref 0–0.9)
MONOCYTES # BLD AUTO: 0.67 K/UL — SIGNIFICANT CHANGE UP (ref 0–0.9)
MONOCYTES NFR BLD AUTO: 7.8 % — SIGNIFICANT CHANGE UP (ref 2–14)
MONOCYTES NFR BLD AUTO: 8.1 % — SIGNIFICANT CHANGE UP (ref 2–14)
NEUTROPHILS # BLD AUTO: 3.54 K/UL — SIGNIFICANT CHANGE UP (ref 1.8–7.4)
NEUTROPHILS # BLD AUTO: 5.12 K/UL — SIGNIFICANT CHANGE UP (ref 1.8–7.4)
NEUTROPHILS NFR BLD AUTO: 47.8 % — SIGNIFICANT CHANGE UP (ref 43–77)
NEUTROPHILS NFR BLD AUTO: 60 % — SIGNIFICANT CHANGE UP (ref 43–77)
NRBC # BLD: 0 /100 WBCS — SIGNIFICANT CHANGE UP (ref 0–0)
PLATELET # BLD AUTO: 321 K/UL — SIGNIFICANT CHANGE UP (ref 150–400)
PLATELET # BLD AUTO: 349 K/UL — SIGNIFICANT CHANGE UP (ref 150–400)
POTASSIUM SERPL-MCNC: 3.8 MMOL/L — SIGNIFICANT CHANGE UP (ref 3.5–5.3)
POTASSIUM SERPL-SCNC: 3.8 MMOL/L — SIGNIFICANT CHANGE UP (ref 3.5–5.3)
PROT SERPL-MCNC: 7.1 G/DL — SIGNIFICANT CHANGE UP (ref 6–8.3)
PROTHROM AB SERPL-ACNC: 12 SEC — SIGNIFICANT CHANGE UP (ref 10–12.9)
RBC # BLD: 4.33 M/UL — SIGNIFICANT CHANGE UP (ref 3.8–5.2)
RBC # BLD: 4.34 M/UL — SIGNIFICANT CHANGE UP (ref 3.8–5.2)
RBC # FLD: 22.5 % — HIGH (ref 10.3–14.5)
RBC # FLD: 23.2 % — HIGH (ref 10.3–14.5)
RH IG SCN BLD-IMP: POSITIVE — SIGNIFICANT CHANGE UP
RH IG SCN BLD-IMP: POSITIVE — SIGNIFICANT CHANGE UP
SODIUM SERPL-SCNC: 141 MMOL/L — SIGNIFICANT CHANGE UP (ref 135–145)
TROPONIN T SERPL-MCNC: <0.01 NG/ML — SIGNIFICANT CHANGE UP (ref 0–0.01)
WBC # BLD: 7.4 K/UL — SIGNIFICANT CHANGE UP (ref 3.8–10.5)
WBC # BLD: 8.54 K/UL — SIGNIFICANT CHANGE UP (ref 3.8–10.5)
WBC # FLD AUTO: 7.4 K/UL — SIGNIFICANT CHANGE UP (ref 3.8–10.5)
WBC # FLD AUTO: 8.54 K/UL — SIGNIFICANT CHANGE UP (ref 3.8–10.5)

## 2019-06-21 PROCEDURE — 84484 ASSAY OF TROPONIN QUANT: CPT

## 2019-06-21 PROCEDURE — 82550 ASSAY OF CK (CPK): CPT

## 2019-06-21 PROCEDURE — 86923 COMPATIBILITY TEST ELECTRIC: CPT

## 2019-06-21 PROCEDURE — 82553 CREATINE MB FRACTION: CPT

## 2019-06-21 PROCEDURE — 86850 RBC ANTIBODY SCREEN: CPT

## 2019-06-21 PROCEDURE — 86900 BLOOD TYPING SEROLOGIC ABO: CPT

## 2019-06-21 PROCEDURE — 36415 COLL VENOUS BLD VENIPUNCTURE: CPT

## 2019-06-21 PROCEDURE — 85025 COMPLETE CBC W/AUTO DIFF WBC: CPT

## 2019-06-21 PROCEDURE — 85610 PROTHROMBIN TIME: CPT

## 2019-06-21 PROCEDURE — 85730 THROMBOPLASTIN TIME PARTIAL: CPT

## 2019-06-21 PROCEDURE — 80053 COMPREHEN METABOLIC PANEL: CPT

## 2019-06-21 PROCEDURE — P9016: CPT

## 2019-06-21 PROCEDURE — 96360 HYDRATION IV INFUSION INIT: CPT

## 2019-06-21 PROCEDURE — 86901 BLOOD TYPING SEROLOGIC RH(D): CPT

## 2019-06-21 PROCEDURE — 36430 TRANSFUSION BLD/BLD COMPNT: CPT

## 2019-06-21 PROCEDURE — 99285 EMERGENCY DEPT VISIT HI MDM: CPT | Mod: 25

## 2019-06-21 PROCEDURE — 96361 HYDRATE IV INFUSION ADD-ON: CPT

## 2019-06-21 PROCEDURE — 99284 EMERGENCY DEPT VISIT MOD MDM: CPT

## 2019-06-21 RX ORDER — SODIUM CHLORIDE 9 MG/ML
1000 INJECTION INTRAMUSCULAR; INTRAVENOUS; SUBCUTANEOUS
Refills: 0 | Status: DISCONTINUED | OUTPATIENT
Start: 2019-06-21 | End: 2019-06-25

## 2019-06-21 RX ORDER — FERROUS SULFATE 325(65) MG
1 TABLET ORAL
Qty: 0 | Refills: 0 | DISCHARGE

## 2019-06-21 RX ADMIN — SODIUM CHLORIDE 125 MILLILITER(S): 9 INJECTION INTRAMUSCULAR; INTRAVENOUS; SUBCUTANEOUS at 17:08

## 2019-06-21 RX ADMIN — SODIUM CHLORIDE 1000 MILLILITER(S): 9 INJECTION INTRAMUSCULAR; INTRAVENOUS; SUBCUTANEOUS at 22:19

## 2019-06-21 NOTE — ED ADULT NURSE NOTE - OBJECTIVE STATEMENT
c/o intermittent near syncopal episodes, sob, palpitations that was more noticeable in the last 3 month. Pt quotes "I get palpitations walking 1 flight of stair and I feel I can't get enough air." Pt also reported mid sternal chest pain described as "dull-ache." Denies black/bloody stools or hematemesis. Pt was referred by Dr. Hylton to get Iron Infusion. Noted Hgb 8.7, Hct 31.2, and Ferritin 6 from last Wednesday's result.

## 2019-06-21 NOTE — ED ADULT NURSE NOTE - NSIMPLEMENTINTERV_GEN_ALL_ED
Implemented All Universal Safety Interventions:  [place] to call system. Call bell, personal items and telephone within reach. Instruct patient to call for assistance. Room bathroom lighting operational. Non-slip footwear when patient is off stretcher. Physically safe environment: no spills, clutter or unnecessary equipment. Stretcher in lowest position, wheels locked, appropriate side rails in place.

## 2019-06-21 NOTE — ED PROVIDER NOTE - NSFOLLOWUPINSTRUCTIONS_ED_ALL_ED_FT
English
Iron Deficiency Anemia    WHAT YOU NEED TO KNOW:    What is iron deficiency anemia? Iron deficiency anemia (MEAGHAN) is low levels of red blood cells and hemoglobin caused by a lack of iron in the blood. Iron helps make hemoglobin. Hemoglobin is part of your red blood cell and helps carry oxygen to your body. The most common causes are blood loss and not enough iron in the foods you eat.     What increases my risk for MEAGHAN?     A woman's monthly period      Donating blood more than 5 times a year      Pregnancy and breastfeeding      A vegan diet      NSAIDs such as ibuprofen or aspirin      Trauma or bleeding in your intestines    What are the signs and symptoms of MEAGHAN?     Weakness and tiredness      Shortness of breath with activity      Fast heartbeat or dizziness      Headaches or trouble concentrating      Pale skin      Sore or swollen tongue and mouth      Nails that break easily      An urge to eat ice, paint, starch, or dirt    How is MEAGHAN diagnosed?     Blood tests will show how much iron is in your blood and how your body uses the iron.       A bowel movement sample will show any blood in your bowel movement.      An endoscopy is a procedure used to check for bleeding in your esophagus or stomach. An endoscope is a bendable tube with a light and camera on the end. It is put into your esophagus through your mouth and throat.      A colonoscopy is a procedure used to check for bleeding in your intestines. A scope is put into your rectum.    How is MEAGHAN treated?     Iron or folic acid supplements help increase hemoglobin and red blood cell levels.       Bowel movement softeners may be needed if the iron supplements cause constipation.      A blood transfusion may be needed if your anemia is severe. This will help replace the blood and iron you have lost.     How can I manage my symptoms?     Eat foods rich in iron and protein. Nuts, meat, dark leafy green vegetables, and beans are high in iron and protein. Do not drink coffee, tea, or other liquids with caffeine. Limit milk to 2 cups a day. You may need to meet with a dietitian to create the right food plan for you.       Drink liquids as directed to help prevent constipation. Ask how much liquid to drink each day and which liquids are best for you.     Call 911 for any of the following:     You have shortness of breath, even when you rest.        When should I seek immediate care?     You have dark or bloody bowel movements.      You vomit blood.       You are too dizzy to stand up.      You have trouble swallowing because of the pain in your mouth and throat.    When should I contact my healthcare provider?     You have heartburn, constipation, or diarrhea.      You have nausea or are vomiting.      You are dizzy or very tired.      You have questions or concerns about your condition or care.    CARE AGREEMENT:    You have the right to help plan your care. Learn about your health condition and how it may be treated. Discuss treatment options with your healthcare providers to decide what care you want to receive. You always have the right to refuse treatment.        © Copyright [employer] 2019 All illustrations and images included in CareNotes are the copyrighted property of [employer].A[employer] Inc[employer] or [employer].      back to top                      © Copyright [employer] 2019

## 2019-06-21 NOTE — ED ADULT NURSE NOTE - PMH
Anemia    Gastroesophageal reflux disease, esophagitis presence not specified    HTN (hypertension)    Obesity, unspecified classification, unspecified obesity type, unspecified whether serious comorbidity present    Prediabetes

## 2019-06-21 NOTE — ED PROVIDER NOTE - CLINICAL SUMMARY MEDICAL DECISION MAKING FREE TEXT BOX
Sx anemia  will transfuse - labs  nl  may dc after Tx Sx anemia  will transfuse - labs  nl  may dc after Tx  feeling better  no sig increase in CBC  likely drawn a bit early stable for dc  to heme in 3 days   continue iron

## 2019-06-21 NOTE — ED PROVIDER NOTE - OBJECTIVE STATEMENT
37 yo F with hx of anemia  scheduled to have iron transfusions in the next week sent to ED for blood transfusion per Dr Hylton- having sob and palp with exertion no sabiha CP - no N/V no rectal bleeding- no sabiha melena - is taking iron supplements currrently

## 2019-06-21 NOTE — ED PROVIDER NOTE - CARE PROVIDER_API CALL
Piper Hylton)  HematologyOncology; Internal Medicine  [address] [place] [address]  Phone: [phone]  Fax: [phone]  Follow Up Time: 1-3 Days

## 2019-06-22 PROBLEM — I10 ESSENTIAL (PRIMARY) HYPERTENSION: Chronic | Status: ACTIVE | Noted: 2018-01-23

## 2019-06-22 PROBLEM — K21.9 GASTRO-ESOPHAGEAL REFLUX DISEASE WITHOUT ESOPHAGITIS: Chronic | Status: ACTIVE | Noted: 2018-01-23

## 2019-06-22 PROBLEM — R73.03 PREDIABETES: Chronic | Status: ACTIVE | Noted: 2018-01-23

## 2019-06-22 PROBLEM — E66.9 OBESITY, UNSPECIFIED: Chronic | Status: ACTIVE | Noted: 2018-01-23

## 2019-06-25 DIAGNOSIS — D64.9 ANEMIA, UNSPECIFIED: ICD-10-CM

## 2019-06-25 DIAGNOSIS — R06.09 OTHER FORMS OF DYSPNEA: ICD-10-CM

## 2019-06-25 DIAGNOSIS — R79.89 OTHER SPECIFIED ABNORMAL FINDINGS OF BLOOD CHEMISTRY: ICD-10-CM

## 2019-07-12 ENCOUNTER — EMERGENCY (EMERGENCY)
Facility: HOSPITAL | Age: 39
LOS: 1 days | Discharge: HOME | End: 2019-07-12
Attending: EMERGENCY MEDICINE | Admitting: EMERGENCY MEDICINE
Payer: MEDICAID

## 2019-07-12 VITALS
WEIGHT: 157.63 LBS | TEMPERATURE: 99 F | OXYGEN SATURATION: 98 % | SYSTOLIC BLOOD PRESSURE: 106 MMHG | RESPIRATION RATE: 18 BRPM | HEART RATE: 86 BPM | DIASTOLIC BLOOD PRESSURE: 72 MMHG

## 2019-07-12 VITALS
TEMPERATURE: 98 F | DIASTOLIC BLOOD PRESSURE: 68 MMHG | HEART RATE: 62 BPM | OXYGEN SATURATION: 98 % | RESPIRATION RATE: 18 BRPM | SYSTOLIC BLOOD PRESSURE: 115 MMHG

## 2019-07-12 DIAGNOSIS — Z98.84 BARIATRIC SURGERY STATUS: Chronic | ICD-10-CM

## 2019-07-12 DIAGNOSIS — Z41.9 ENCOUNTER FOR PROCEDURE FOR PURPOSES OTHER THAN REMEDYING HEALTH STATE, UNSPECIFIED: Chronic | ICD-10-CM

## 2019-07-12 DIAGNOSIS — Z98.890 OTHER SPECIFIED POSTPROCEDURAL STATES: Chronic | ICD-10-CM

## 2019-07-12 PROBLEM — D64.9 ANEMIA, UNSPECIFIED: Chronic | Status: ACTIVE | Noted: 2019-06-21

## 2019-07-12 LAB
ALBUMIN SERPL ELPH-MCNC: 4.1 G/DL — SIGNIFICANT CHANGE UP (ref 3.3–5)
ALP SERPL-CCNC: 87 U/L — SIGNIFICANT CHANGE UP (ref 40–120)
ALT FLD-CCNC: 11 U/L — SIGNIFICANT CHANGE UP (ref 10–45)
ANION GAP SERPL CALC-SCNC: 10 MMOL/L — SIGNIFICANT CHANGE UP (ref 5–17)
AST SERPL-CCNC: 17 U/L — SIGNIFICANT CHANGE UP (ref 10–40)
BASOPHILS # BLD AUTO: 0.06 K/UL — SIGNIFICANT CHANGE UP (ref 0–0.2)
BASOPHILS NFR BLD AUTO: 0.7 % — SIGNIFICANT CHANGE UP (ref 0–2)
BILIRUB SERPL-MCNC: 0.2 MG/DL — SIGNIFICANT CHANGE UP (ref 0.2–1.2)
BUN SERPL-MCNC: 8 MG/DL — SIGNIFICANT CHANGE UP (ref 7–23)
CALCIUM SERPL-MCNC: 9.2 MG/DL — SIGNIFICANT CHANGE UP (ref 8.4–10.5)
CHLORIDE SERPL-SCNC: 109 MMOL/L — HIGH (ref 96–108)
CO2 SERPL-SCNC: 24 MMOL/L — SIGNIFICANT CHANGE UP (ref 22–31)
CREAT SERPL-MCNC: 0.79 MG/DL — SIGNIFICANT CHANGE UP (ref 0.5–1.3)
EOSINOPHIL # BLD AUTO: 0.19 K/UL — SIGNIFICANT CHANGE UP (ref 0–0.5)
EOSINOPHIL NFR BLD AUTO: 2.1 % — SIGNIFICANT CHANGE UP (ref 0–6)
GLUCOSE SERPL-MCNC: 83 MG/DL — SIGNIFICANT CHANGE UP (ref 70–99)
HCT VFR BLD CALC: 38.2 % — SIGNIFICANT CHANGE UP (ref 34.5–45)
HGB BLD-MCNC: 10.9 G/DL — LOW (ref 11.5–15.5)
IMM GRANULOCYTES NFR BLD AUTO: 0.4 % — SIGNIFICANT CHANGE UP (ref 0–1.5)
LIDOCAIN IGE QN: 25 U/L — SIGNIFICANT CHANGE UP (ref 7–60)
LYMPHOCYTES # BLD AUTO: 2.7 K/UL — SIGNIFICANT CHANGE UP (ref 1–3.3)
LYMPHOCYTES # BLD AUTO: 29.6 % — SIGNIFICANT CHANGE UP (ref 13–44)
MCHC RBC-ENTMCNC: 21.8 PG — LOW (ref 27–34)
MCHC RBC-ENTMCNC: 28.5 GM/DL — LOW (ref 32–36)
MCV RBC AUTO: 76.2 FL — LOW (ref 80–100)
MONOCYTES # BLD AUTO: 0.65 K/UL — SIGNIFICANT CHANGE UP (ref 0–0.9)
MONOCYTES NFR BLD AUTO: 7.1 % — SIGNIFICANT CHANGE UP (ref 2–14)
NEUTROPHILS # BLD AUTO: 5.47 K/UL — SIGNIFICANT CHANGE UP (ref 1.8–7.4)
NEUTROPHILS NFR BLD AUTO: 60.1 % — SIGNIFICANT CHANGE UP (ref 43–77)
NRBC # BLD: 0 /100 WBCS — SIGNIFICANT CHANGE UP (ref 0–0)
PLATELET # BLD AUTO: 307 K/UL — SIGNIFICANT CHANGE UP (ref 150–400)
POTASSIUM SERPL-MCNC: 4.2 MMOL/L — SIGNIFICANT CHANGE UP (ref 3.5–5.3)
POTASSIUM SERPL-SCNC: 4.2 MMOL/L — SIGNIFICANT CHANGE UP (ref 3.5–5.3)
PROT SERPL-MCNC: 6.8 G/DL — SIGNIFICANT CHANGE UP (ref 6–8.3)
RBC # BLD: 5.01 M/UL — SIGNIFICANT CHANGE UP (ref 3.8–5.2)
RBC # FLD: 31.8 % — HIGH (ref 10.3–14.5)
SODIUM SERPL-SCNC: 143 MMOL/L — SIGNIFICANT CHANGE UP (ref 135–145)
WBC # BLD: 9.11 K/UL — SIGNIFICANT CHANGE UP (ref 3.8–10.5)
WBC # FLD AUTO: 9.11 K/UL — SIGNIFICANT CHANGE UP (ref 3.8–10.5)

## 2019-07-12 PROCEDURE — 83690 ASSAY OF LIPASE: CPT

## 2019-07-12 PROCEDURE — 96375 TX/PRO/DX INJ NEW DRUG ADDON: CPT

## 2019-07-12 PROCEDURE — 71046 X-RAY EXAM CHEST 2 VIEWS: CPT

## 2019-07-12 PROCEDURE — 74177 CT ABD & PELVIS W/CONTRAST: CPT | Mod: 26

## 2019-07-12 PROCEDURE — 36415 COLL VENOUS BLD VENIPUNCTURE: CPT

## 2019-07-12 PROCEDURE — 93005 ELECTROCARDIOGRAM TRACING: CPT

## 2019-07-12 PROCEDURE — 80053 COMPREHEN METABOLIC PANEL: CPT

## 2019-07-12 PROCEDURE — 99284 EMERGENCY DEPT VISIT MOD MDM: CPT | Mod: 25

## 2019-07-12 PROCEDURE — 85025 COMPLETE CBC W/AUTO DIFF WBC: CPT

## 2019-07-12 PROCEDURE — 71046 X-RAY EXAM CHEST 2 VIEWS: CPT | Mod: 26

## 2019-07-12 PROCEDURE — 96374 THER/PROPH/DIAG INJ IV PUSH: CPT | Mod: XU

## 2019-07-12 PROCEDURE — 99285 EMERGENCY DEPT VISIT HI MDM: CPT

## 2019-07-12 PROCEDURE — 74177 CT ABD & PELVIS W/CONTRAST: CPT

## 2019-07-12 PROCEDURE — 93010 ELECTROCARDIOGRAM REPORT: CPT

## 2019-07-12 PROCEDURE — 84484 ASSAY OF TROPONIN QUANT: CPT

## 2019-07-12 RX ORDER — IOHEXOL 300 MG/ML
30 INJECTION, SOLUTION INTRAVENOUS ONCE
Refills: 0 | Status: COMPLETED | OUTPATIENT
Start: 2019-07-12 | End: 2019-07-12

## 2019-07-12 RX ORDER — ONDANSETRON 8 MG/1
4 TABLET, FILM COATED ORAL EVERY 8 HOURS
Refills: 0 | Status: DISCONTINUED | OUTPATIENT
Start: 2019-07-12 | End: 2019-07-16

## 2019-07-12 RX ORDER — MORPHINE SULFATE 50 MG/1
4 CAPSULE, EXTENDED RELEASE ORAL ONCE
Refills: 0 | Status: DISCONTINUED | OUTPATIENT
Start: 2019-07-12 | End: 2019-07-12

## 2019-07-12 RX ORDER — PANTOPRAZOLE SODIUM 20 MG/1
40 TABLET, DELAYED RELEASE ORAL ONCE
Refills: 0 | Status: COMPLETED | OUTPATIENT
Start: 2019-07-12 | End: 2019-07-12

## 2019-07-12 RX ORDER — LIDOCAINE 4 G/100G
10 CREAM TOPICAL ONCE
Refills: 0 | Status: COMPLETED | OUTPATIENT
Start: 2019-07-12 | End: 2019-07-12

## 2019-07-12 RX ADMIN — PANTOPRAZOLE SODIUM 40 MILLIGRAM(S): 20 TABLET, DELAYED RELEASE ORAL at 14:40

## 2019-07-12 RX ADMIN — LIDOCAINE 10 MILLILITER(S): 4 CREAM TOPICAL at 14:40

## 2019-07-12 RX ADMIN — MORPHINE SULFATE 4 MILLIGRAM(S): 50 CAPSULE, EXTENDED RELEASE ORAL at 14:40

## 2019-07-12 RX ADMIN — ONDANSETRON 4 MILLIGRAM(S): 8 TABLET, FILM COATED ORAL at 14:40

## 2019-07-12 RX ADMIN — MORPHINE SULFATE 4 MILLIGRAM(S): 50 CAPSULE, EXTENDED RELEASE ORAL at 17:08

## 2019-07-12 RX ADMIN — IOHEXOL 30 MILLILITER(S): 300 INJECTION, SOLUTION INTRAVENOUS at 14:40

## 2019-07-12 RX ADMIN — Medication 30 MILLILITER(S): at 14:40

## 2019-07-12 NOTE — ED PROVIDER NOTE - PHYSICAL EXAMINATION
Constitutional: Well appearing, well nourished, awake, alert, oriented to person, place, time/situation and in no apparent distress.  ENMT: Airway patent. Normal MM  Eyes: Clear bilaterally. no conjunctival pallor. anicteric.   Cardiac: Normal rate, regular rhythm.  Heart sounds S1, S2.  No murmurs, rubs or gallops. No JVD or LE edema  Respiratory: Breaths sounds equal and clear b/l. No increased WOB, tachypnea, hypoxia, or accessory mm use. Pt speaks in full sentences.   Gastrointestinal: Abd soft, ND, hypoactive BS. + mild diffuse upper abd ttp. No guarding, rebound, or rigidity. No pulsatile abdominal masses. No organomegaly appreciated. No CVAT. no abdominal hernia appreciated  Musculoskeletal: Range of motion is not limited. no calf ttp.   Neuro: Alert and oriented x 3, face symmetric and speech fluent. Strength 5/5 x 4 ext and symmetric, nml gross motor movement, nml gait. No focal deficits noted.  Skin: Skin normal color for race, warm, dry and intact. No evidence of rash.  Psych: Alert and oriented to person, place, time/situation. normal mood and affect. no apparent risk to self or others.

## 2019-07-12 NOTE — ED PROVIDER NOTE - NSFOLLOWUPINSTRUCTIONS_ED_ALL_ED_FT
Increase your omeprazole to 40 mg daily x 2 weeks. Add overt-the-counter Maalox or Gaviscon when you have symptoms. Remain upright for at least 1 hour after eating. Avoid smoking, alcohol, food / drinks high in acidity (coffee, citrus, chocolate). Follow up closely with your surgeon and gastroenterologist.     Hiatal Hernia    WHAT YOU NEED TO KNOW:    A hiatal hernia is a condition that causes part of your stomach to bulge through the hiatus (small opening) in your diaphragm. The part of the stomach may move up and down, or it may get trapped above the diaphragm. Hiatal Hernia         DISCHARGE INSTRUCTIONS:    Return to the emergency department if:     You have severe abdominal pain.      You try to vomit but nothing comes out (retching).       You have severe chest pain and sudden trouble breathing.      Your bowel movements are black or bloody.      Your vomit looks like coffee grounds or has blood in it.    Contact your healthcare provider if:     Your symptoms are getting worse.      You have nausea, and you are vomiting.      You are losing weight without trying.      You have questions or concerns about your condition or care.    Medicines:     Medicines may be given to relieve heartburn symptoms. These medicines help to decrease or block stomach acid. You may also be given medicines that help to tighten the esophageal sphincter.       Take your medicine as directed. Contact your healthcare provider if you think your medicine is not helping or if you have side effects. Tell him or her if you are allergic to any medicine. Keep a list of the medicines, vitamins, and herbs you take. Include the amounts, and when and why you take them. Bring the list or the pill bottles to follow-up visits. Carry your medicine list with you in case of an emergency.    Follow up with your healthcare provider as directed: Write down your questions so you remember to ask them during your visits.    Self care:     Avoid foods that make your symptoms worse. These may include spicy foods, fruit juices, alcohol, caffeine, chocolate, and mint.       Eat several small meals during the day. Small meals give your stomach less food to digest.      Avoid lying down and bending forward after you eat. Do not eat meals 2 to 3 hours before bedtime. This decreases your risk for reflux.      Maintain a healthy weight. If you are overweight, weight loss may help relieve your symptoms.      Sleep with your head elevated at least 6 inches.       Do not smoke. Smoking can increase your symptoms of heartburn.    Gastritis    WHAT YOU NEED TO KNOW:    Gastritis is inflammation or irritation of the lining of your stomach. Abdominal Organs         DISCHARGE INSTRUCTIONS:    Call 911 for any of the following:     You develop chest pain or shortness of breath.        Return to the emergency department if:     You vomit blood.      You have black or bloody bowel movements.      You have severe stomach or back pain.    Contact your healthcare provider if:     You have a fever.      You have new or worsening symptoms, even after treatment.      You have questions or concerns about your condition or care.    Medicines:     Medicines may be given to help treat a bacterial infection or decrease stomach acid.       Take your medicine as directed. Contact your healthcare provider if you think your medicine is not helping or if you have side effects. Tell him or her if you are allergic to any medicine. Keep a list of the medicines, vitamins, and herbs you take. Include the amounts, and when and why you take them. Bring the list or the pill bottles to follow-up visits. Carry your medicine list with you in case of an emergency.    Manage or prevent gastritis:     Do not smoke. Nicotine and other chemicals in cigarettes and cigars can make your symptoms worse and cause lung damage. Ask your healthcare provider for information if you currently smoke and need help to quit. E-cigarettes or smokeless tobacco still contain nicotine. Talk to your healthcare provider before you use these products.       Do not drink alcohol. Alcohol can prevent healing and make your gastritis worse. Talk to your healthcare provider if you need help to stop drinking.      Do not take NSAIDs or aspirin unless directed. These and similar medicines can cause irritation. If your healthcare provider says it is okay to take NSAIDs, take them with food.       Do not eat foods that cause irritation. Foods such as oranges and salsa can cause burning or pain. Eat a variety of healthy foods. Examples include fruits (not citrus), vegetables, low-fat dairy products, beans, whole-grain breads, and lean meats and fish. Try to eat small meals, and drink water with your meals. Do not eat for at least 3 hours before you go to bed.      Find ways to relax and decrease stress. Stress can increase stomach acid and make gastritis worse. Activities such as yoga, meditation, or listening to music can help you relax. Spend time with friends, or do things you enjoy.    Follow up with your healthcare provider as directed: You may need ongoing tests or treatment, or referral to a gastroenterologist. Write down your questions so you remember to ask them during your visits.

## 2019-07-12 NOTE — ED PROVIDER NOTE - OBJECTIVE STATEMENT
Pt w/ PMHx anemia s/p pRBC and iron transfusions (last iron 4 days ago, last pRBC < 1 month ago), HTN, GERD, [place] s/p sleeve gastrectomy 2011 s/p conversion to gastric bypass Jan 2018, hiatal hernia, cholecystectomy, p/w diffuse upper abd pain radiating into the chest and R abdomen x 6 weeks. The pain is described as sharp and achy, progressively worsening, and constant. The pain is worse after eating, and is better when laying on the abdomen. Pt reports she sometimes feels a bulging in her RUQ when standing. Pt reports int vomiting, last 2 days ago. + normal BM this morning. Pt reports int low grade temp 99 - 101, last 1 week ago. The pain is not positional, nor pleuritic, nor exertional. Pt was told she had "mild hiatal hernia last year." Last EGD 1-2 years ago, GERD only. No PUD. Pt takes Omeprazole 20 mg QD, but did not take it this morning. Pt has not seen surgeon Dr Leggett, nor her GI for these sx

## 2019-07-12 NOTE — ED ADULT TRIAGE NOTE - CHIEF COMPLAINT QUOTE
" Every time I get iron infusion, I have this sharp pain in my epigastric area and the pain goes to my chest pain to my right upper back".

## 2019-07-12 NOTE — ED ADULT NURSE NOTE - NSIMPLEMENTINTERV_GEN_ALL_ED
Implemented All Universal Safety Interventions:  [place] to call system. Call bell, personal items and telephone within reach. Instruct patient to call for assistance. Room bathroom lighting operational. Non-slip footwear when patient is off stretcher. Physically safe environment: no spills, clutter or unnecessary equipment. Stretcher in lowest position, wheels locked, appropriate side rails in place.

## 2019-07-12 NOTE — ED PROVIDER NOTE - PROGRESS NOTE DETAILS
W/u neg for acute pathology. D/w pt again hiatal hernia, gastritis, diet modifications, close f/u w/ her GI and her surgeon

## 2019-07-12 NOTE — ED ADULT NURSE NOTE - OBJECTIVE STATEMENT
Patient is a 39yo female reporting epigastric pain radiating to right flank x1 month. Patient receives iron infusion for iron-deficiency anemia but was sent to ED today without infusion due to patient having increasing abdominal pain. Denies chest pain, shortness of breath, fevers, diarrhea. Reports intermittent vomiting when pain is severe, as well as abdominal distention.

## 2019-07-12 NOTE — ED PROVIDER NOTE - CARE PLAN
Principal Discharge DX:	Hiatal hernia with GERD  Secondary Diagnosis:	Gastritis, presence of bleeding unspecified, unspecified chronicity, unspecified gastritis type

## 2019-07-12 NOTE — ED PROVIDER NOTE - CLINICAL SUMMARY MEDICAL DECISION MAKING FREE TEXT BOX
Pt p/w 6 weeks constant, progressively worsening epigastric pain radiating into the chest, worse after eating. No acute ST changes. H&P not c/w ACS, dissection or PE. Sx most likely related to known hiatal hernia, not recently evaluated. Also consider complication of gastric bypass. Check labs, EKG, CXR, CT a/p, IVF, NPO, GI cocktail. Dispo pending w/u and clinical status

## 2019-07-16 DIAGNOSIS — K21.9 GASTRO-ESOPHAGEAL REFLUX DISEASE WITHOUT ESOPHAGITIS: ICD-10-CM

## 2019-07-16 DIAGNOSIS — R10.11 RIGHT UPPER QUADRANT PAIN: ICD-10-CM

## 2019-07-16 DIAGNOSIS — K29.70 GASTRITIS, UNSPECIFIED, WITHOUT BLEEDING: ICD-10-CM

## 2019-07-16 DIAGNOSIS — K44.9 DIAPHRAGMATIC HERNIA WITHOUT OBSTRUCTION OR GANGRENE: ICD-10-CM

## 2019-12-03 NOTE — ED ADULT NURSE REASSESSMENT NOTE - NS ED NURSE REASSESS COMMENT FT1
as per lab, occult card does not have any stool specimen on it. md ortiz notified
dispo. orders received, / Glaisrrael at bedside speaking with pt.
patient at rest in stretcher, PRBC infusing, no reaction at this time. Denies complaints. NAD Noted
Yes

## 2020-04-02 PROBLEM — R09.81 NASAL CONGESTION WITH RHINORRHEA: Status: ACTIVE | Noted: 2017-08-21

## 2021-04-30 NOTE — DISCHARGE NOTE ADULT - BECAUSE OF A PHYSICAL, MENTAL OR EMOTIONAL CONDITION, DO YOU HAVE DIFFICULTY DOING  ERRANDS ALONE LIKE VISITING A DOCTOR'S OFFICE OR SHOPPING (15 YEARS AND OLDER)
Continue current regimen:  · Morphine Sulf Er 60mg BID   · Morphine Sulf Er 15mg BID  · Morphine Sulf Ir 15mg  TID PRN  · Pregabalin 150mg BID  · Carisoprodol 350mg BID  · Valium 5 mg daily No

## 2021-06-17 NOTE — ED ADULT NURSE NOTE - NS PRO PASSIVE SMOKE EXP
EMERGENCY DEPARTMENT ENCOUnter      NAME: Tanmay Israel  AGE: 80 y.o. male  YOB: 1941  MRN: [id number]  EVALUATION DATE & TIME: 5/10/2021  6:53 PM    PCP: Go Ramírez MD    ED PROVIDER: Kwame Good DO      Chief Complaint   Patient presents with     Foreign body in throat         FINAL IMPRESSION:  1. Esophageal obstruction due to food impaction    2. Esophageal obstruction due to food impaction          ED COURSE & MEDICAL DECISION MAKIN:52 PM I met with the patient to gather history and to perform my initial exam wearing goggles, facial shield, cap, gloves, and N95 mask. I discussed the plan for care while in the Emergency Department.    8:50 PM I spoke with GI, Dr. Mendoza.     The patient presented emergency department today with an esophageal food impaction.  He was unable to tolerate his secretions.  He we tried a dose of easy gas but this did not resolve his symptoms.  I discussed his case with GI and they came to the emergency department to perform an endoscopy.  They found a large volume of food impacted in his esophagus.  They were able to clear this obstruction and the patient symptoms have resolved.  They recommend close outpatient follow-up in their clinic.  The patient is comfortable with this plan.    The patient is critically ill and has required 35 minutes of critical care time exclusive of procedures. This includes time spent interviewing the patient, ordering tests and medications, monitoring vital signs, reviewing results, patient updates, discussing the case with family and consultants, and admission.      At the conclusion of the encounter I discussed the results of all of the tests and the disposition. The questions were answered. The patient or family acknowledged understanding and was agreeable with the care plan.       =================================================================    HPI        Tanmay Israel is a 80 y.o. male with a pertinent history of GERD who  presents to this ED with a friend for evaluation of foreign body in throat.    About 6 hours ago, the patient was eating a pork chop. He states that he doesn't chew very well and now he can feel it stuck. He's been spitting up everything and having difficulty breathing. He's otherwise fine and denies and abdominal pain or any other symptoms at this time.     Of note, the patient had esophagus dilation a few years ago.       REVIEW OF SYSTEMS     Constitutional:  Denies fever or chills  HENT: Positive for pork chop stuck in throat. Denies sore throat   Respiratory: Positive for associated shortness of breath. Denies cough   Cardiovascular:  Denies chest pain or palpitations  GI:  Denies abdominal pain, nausea, or vomiting  Musculoskeletal:  Denies any new extremity pain   Skin:  Denies rash   Neurologic:  Denies headache, focal weakness or sensory changes    All other systems reviewed and are negative      PAST MEDICAL HISTORY:  Past Medical History:   Diagnosis Date     Acute cholecystitis 2/28/2019     Acute post-operative pain      Anemia      Arthritis      Atrial fibrillation (H)      C. difficile diarrhea 4/21/2019     Calculus of ureter      Callus      Cerebral aneurysm      Cervical myelopathy (H) 2/22/2019     Cervical spinal stenosis      Chronic kidney disease     stage 3     Chronic systolic congestive heart failure (H)      Closed fracture of distal end of left radius, unspecified fracture morphology, initial encounter      Closed fracture of distal end of right radius, unspecified fracture morphology, initial encounter      Closed fracture of first thoracic vertebra, unspecified fracture morphology, initial encounter (H)      Coronary artery disease      Crohn's disease (H)      Dysphagia      Fall 10/22/2016     GERD (gastroesophageal reflux disease)      Hyperlipidemia      Hypertension      Hypotension, unspecified hypotension type      ICD (implantable cardioverter-defibrillator) in place      Medtronic     Ischemic cardiomyopathy      Kidney stone      Low back pain      Lumbago     Created by Conversion      Macular degeneration      Muscle Aches, Generalized (Myalgias)     Created by Conversion      Permanent atrial fibrillation (H)     [id number] risk score = 5 (age3/ CAD/ HTN/ CHF) Chronic warfarin     Personal history of colonic polyps 2/12/2019     Polyp of duodenum 10/17/2016     Pyuria      Right arm weakness 4/2/2019     Right Rotator Cuff Tendonitis     Created by Conversion  Replacement Utility updated for latest IMO load     Schatzki's ring      Sciatica     Created by Conversion      Serum Enzyme Levels - ALT (SGPT) Elevated     Created by Conversion      Sleep apnea     no CPAP     Spondylolisthesis of lumbar region 7/5/2016     Weakness 4/20/2019       PAST SURGICAL HISTORY:  Past Surgical History:   Procedure Laterality Date     APPENDECTOMY       CARDIAC DEFIBRILLATOR PLACEMENT  2013    ICD Medtronic     CATARACT EXTRACTION W/  INTRAOCULAR LENS IMPLANT Bilateral      CHOLECYSTECTOMY       COLONOSCOPY N/A 2/19/2020    Procedure: COLONOSCOPY;  Surgeon: Houston Medina MD;  Location: [facility];  Service: Gastroenterology     CORONARY ARTERY BYPASS GRAFT  08/2004    Coronary Artery Quadruple Venous Bypass Graft     ESOPHAGOGASTRODUODENOSCOPY       VT C- LAMINOPLASTY, 2 OR MORE Left 2/22/2019    Procedure: LEFT CERVICAL 4, 5, 6 LAMINOPLASTY;  Surgeon: Liza Cesar MD;  Location: [facility] OR;  Service: Spine     VT LAP,CHOLECYSTECTOMY N/A 2/28/2019    CHOLECYSTECTOMY, LAPAROSCOPIC;  Surgeon: Mana Roman MD;  Location: [facility];  Service: General     ROTATOR CUFF REPAIR Right      TONSILLECTOMY       XR MYELOGRAM CERVICAL THORACIC LUMBAR  1/17/2019           CURRENT MEDICATIONS:    No current facility-administered medications on file prior to encounter.      Current Outpatient Medications on File Prior to Encounter   Medication Sig      acetaminophen (TYLENOL) 500 MG tablet Take 2 tablets (1,000 mg total) by mouth 3 (three) times a day.     amLODIPine (NORVASC) 5 MG tablet Take 1 tablet (5 mg total) by mouth daily.     bumetanide (BUMEX) 1 MG tablet TAKE ONE TABLET (1MG) BY MOUTH ONCE DAILY     carvediloL (COREG) 25 MG tablet TAKE ONE AND ONE-HALF TABLETS (37.5MG) BY MOUTH TWICE DAILY WITH MEALS     cholecalciferol, vitamin D3, 1,000 unit tablet Take 2,000 Units by mouth daily.     cyanocobalamin 500 MCG tablet Take 500 mcg by mouth daily.     cyclobenzaprine (FLEXERIL) 10 MG tablet Take 10 mg by mouth as needed for muscle spasms.     diclofenac sodium (VOLTAREN) 1 % Gel APPLY 2 TO 4 GRAMS TOPICALLY THREE TIMES DAILY     erythromycin ophthalmic ointment Administer 1 application to both eyes 2 (two) times a day.     ferrous sulfate 325 (65 FE) MG tablet Take 1 tablet by mouth daily.     lisinopriL (PRINIVIL,ZESTRIL) 2.5 MG tablet Take 1 tablet (2.5 mg total) by mouth daily.     melatonin 3 mg Tab tablet Take 1 tablet (3 mg total) by mouth at bedtime as needed.     multivitamin with iron (ONE DAILY WITH IRON) Tab tablet Take 1 tablet by mouth daily.      neomycin-polymyxin-dexamethamethasone (POLYDEX) 3.5 mg/g-10,000 unit/g-0.1 % Oint Administer 1 application to both eyes 2 (two) times a day.     omeprazole (PRILOSEC) 20 MG capsule Take 1 capsule (20 mg total) by mouth daily before breakfast.     ondansetron (ZOFRAN) 8 MG tablet Take 1 tablet (8 mg total) by mouth every 8 (eight) hours as needed.     potassium chloride (K-DUR,KLOR-CON) 20 MEQ tablet TAKE ONE TABLET BY MOUTH ONCE DAILY     RESTASIS 0.05 % ophthalmic emulsion Administer 1 drop to both eyes every 12 (twelve) hours.      rosuvastatin (CRESTOR) 20 MG tablet TAKE 1 TABLET BY MOUTH DAILY AT BEDTIME     warfarin ANTICOAGULANT (COUMADIN/JANTOVEN) 2.5 MG tablet Take 1-1.5 tablets (2.5-3.75 mg) by mouth daily. Adjust dose per INR as directed.       ALLERGIES:  No Known Allergies    FAMILY  HISTORY:  Family History   Problem Relation Age of Onset     Heart disease Mother      Stroke Mother      Crohn's disease Father      Alcohol abuse Brother      No Medical Problems Daughter      No Medical Problems Son      No Medical Problems Maternal Aunt      No Medical Problems Maternal Uncle      No Medical Problems Paternal Aunt      No Medical Problems Paternal Uncle      No Medical Problems Maternal Grandmother      No Medical Problems Maternal Grandfather      No Medical Problems Paternal Grandmother      No Medical Problems Paternal Grandfather      Cancer Brother      Urolithiasis Neg Hx      Gout Neg Hx        SOCIAL HISTORY:   Social History     Socioeconomic History     Marital status: Single     Spouse name: None     Number of children: None     Years of education: None     Highest education level: None   Occupational History     Occupation: yang     Employer: RETIRED   Social Needs     Financial resource strain: None     Food insecurity     Worry: None     Inability: None     Transportation needs     Medical: None     Non-medical: None   Tobacco Use     Smoking status: Former Smoker     Smokeless tobacco: Never Used   Substance and Sexual Activity     Alcohol use: No     Comment: rarely     Drug use: No     Sexual activity: None   Lifestyle     Physical activity     Days per week: None     Minutes per session: None     Stress: None   Relationships     Social connections     Talks on phone: None     Gets together: None     Attends Confucianist service: None     Active member of club or organization: None     Attends meetings of clubs or organizations: None     Relationship status: None     Intimate partner violence     Fear of current or ex partner: None     Emotionally abused: None     Physically abused: None     Forced sexual activity: None   Other Topics Concern     None   Social History Narrative     None       VITALS:  Patient Vitals for the past 24 hrs:   BP Temp Temp src Pulse Resp SpO2 Height  "Weight   05/10/21 2240 168/72 -- -- 63 16 99 % -- --   05/10/21 2225 173/76 98  F (36.7  C) Oral 67 13 100 % -- --   05/10/21 2210 (!) 185/78 -- -- 71 18 100 % -- --   05/10/21 2205 179/77 -- -- 70 12 100 % -- --   05/10/21 2200 (!) 184/77 -- -- 64 17 100 % -- --   05/10/21 2155 (!) 200/86 -- -- 66 19 100 % -- --   05/10/21 2149 155/71 -- -- 69 18 100 % -- --   05/10/21 2145 148/67 -- -- 61 -- 99 % -- --   05/10/21 2142 152/65 97.7  F (36.5  C) Oral 65 18 100 % -- --   05/10/21 1851 -- 98.2  F (36.8  C) Temporal 60 18 100 % 5' 9\" (1.753 m) 164 lb (74.4 kg)       PHYSICAL EXAM    Constitutional:  Well developed, Well nourished. Occasionally spitting up secretions.   HENT:  Normocephalic, Atraumatic, Bilateral external ears normal, Oropharynx moist, Nose normal.   Neck:  Normal range of motion, No meningismus, No stridor.   Eyes:  EOMI, Conjunctiva normal, No discharge.   Respiratory:  Normal breath sounds, No respiratory distress, No wheezing, No chest tenderness.   Cardiovascular:  Normal heart rate, Normal rhythm, No murmurs  GI:  Soft, No tenderness, No guarding, No CVA tenderness.   Musculoskeletal:  Neurovascularly intact distally, No edema, No tenderness, No cyanosis, Good range of motion in all major joints. No tenderness to palpation or major deformities noted.   Integument:  Warm, Dry, No erythema, No rash.   Lymphatic:  No lymphadenopathy noted.   Neurologic:  Alert & oriented x 3, Normal motor function, Normal sensory function, No focal deficits noted.   Psychiatric:  Affect normal, Judgment normal, Mood normal.          I, Di Noriega, am serving as a scribe to document services personally performed by Dr. Good based on my observation and the provider's statements to me. I, Kwame Good, DO attest that Di Noriega is acting in a scribe capacity, has observed my performance of the services and has documented them in accordance with my direction.    Kwame Good, DO  Emergency Medicine  [facility] EMERGENCY DEPARTMENT  [address]  [facility] [address]  Dept: [phone]  Loc: [phone]         Kwame Good,   05/10/21 2251       Kwame Good,   05/10/21 2251     Yes...

## 2022-01-19 ENCOUNTER — TRANSCRIPTION ENCOUNTER (OUTPATIENT)
Age: 42
End: 2022-01-19

## 2022-08-05 NOTE — ED PROVIDER NOTE - MUSCULOSKELETAL, MLM
Pt spouse is calling for refills. Pt preferred pharmacy set up and verified.   
Spine appears normal, range of motion is not limited, no muscle or joint tenderness

## 2022-08-08 NOTE — PRE-OP CHECKLIST - INTERNAL PROSTHESES
yes(specify)/screws & plate left foot/right foot screws to big toe Complex Repair And Graft Additional Text (Will Appearing After The Standard Complex Repair Text): The complex repair was not sufficient to completely close the primary defect. The remaining additional defect was repaired with the graft mentioned below.

## 2023-12-19 NOTE — ED PROVIDER NOTE - SKIN, MLM
Skin normal color for race, warm, dry and intact. No evidence of rash. Detail Level: Generalized General Sunscreen Counseling: I recommended a broad spectrum sunscreen with a SPF of 30 or higher.  I explained that SPF 30 sunscreens block approximately 97 percent of the sun's harmful rays.  Sunscreens should be applied at least 15 minutes prior to expected sun exposure and then every 2 hours after that as long as sun exposure continues. If swimming or exercising sunscreen should be reapplied every 45 minutes to an hour after getting wet or sweating.  One ounce, or the equivalent of a shot glass full of sunscreen, is adequate to protect the skin not covered by a bathing suit. I also recommended a lip balm with a sunscreen as well. Sun protective clothing can be used in lieu of sunscreen but must be worn the entire time you are exposed to the sun's rays.

## 2024-05-16 NOTE — DISCHARGE NOTE ADULT - MEDICATION SUMMARY - MEDICATIONS TO TAKE
normal (ped)... I will START or STAY ON the medications listed below when I get home from the hospital:    Percocet 5/325 oral tablet  -- 1 tab(s) by mouth every 6 hours, As Needed -Severe Pain (7 - 10) MDD:4  -- Indication: For Severe pain    ferrous sulfate 325 mg (65 mg elemental iron) oral tablet  -- 1 tab(s) by mouth 3 times a day   -- Check with your doctor before becoming pregnant.  Do not chew, break, or crush.  May discolor urine or feces.    -- Indication: For S/P laparoscopic sleeve gastrectomy    Colace 100 mg oral capsule  -- 1 cap(s) by mouth 2 times a day, As Needed -for constipation   -- Medication should be taken with plenty of water.    -- Indication: For Constipation    omeprazole 40 mg oral delayed release capsule  -- 1 cap(s) by mouth once a day   -- It is very important that you take or use this exactly as directed.  Do not skip doses or discontinue unless directed by your doctor.  Obtain medical advice before taking any non-prescription drugs as some may affect the action of this medication.  Swallow whole.  Do not crush.    -- Indication: For S/P laparoscopic sleeve gastrectomy    Chantix 0.5 mg oral tablet  -- orally once a day  -- Indication: For Smokin Cessation